# Patient Record
Sex: FEMALE | Race: WHITE | NOT HISPANIC OR LATINO | Employment: FULL TIME | URBAN - METROPOLITAN AREA
[De-identification: names, ages, dates, MRNs, and addresses within clinical notes are randomized per-mention and may not be internally consistent; named-entity substitution may affect disease eponyms.]

---

## 2022-08-18 DIAGNOSIS — Z52.001 STEM CELL DONOR: Primary | ICD-10-CM

## 2022-08-31 ENCOUNTER — LAB (OUTPATIENT)
Dept: LAB | Facility: CLINIC | Age: 27
End: 2022-08-31

## 2022-08-31 DIAGNOSIS — Z52.001 STEM CELL DONOR: ICD-10-CM

## 2022-08-31 PROCEDURE — 81382 HLA II TYPING 1 LOC HR: CPT

## 2022-09-14 LAB
A*: NORMAL
A*LOCUS SEROLOGIC EQUIVALENT: 2
A*LOCUS: NORMAL
A*SEROLOGIC EQUIVALENT: 30
ABTEST METHOD: NORMAL
B*: NORMAL
B*LOCUS SEROLOGIC EQUIVALENT: 13
B*LOCUS: NORMAL
B*SEROLOGIC EQUIVALENT: 62
BW-1: NORMAL
BW-2: NORMAL
C*LOCUS SEROLOGIC EQUIVALENT: 6
C*LOCUS: NORMAL
DPA1*: NORMAL
DPB1*: NORMAL
DPB1*LOCUS NMDP: NORMAL
DPB1*LOCUS: NORMAL
DPB1*NMDP: NORMAL
DQA1*: NORMAL
DQA1*LOCUS: NORMAL
DQB1*: NORMAL
DQB1*LOCUS SEROLOGIC EQUIVALENT: 7
DQB1*LOCUS: NORMAL
DQB1*SEROLOGIC EQUIVALENT: 8
DRB1*: NORMAL
DRB1*LOCUS SEROLOGIC EQUIVALENT: 4
DRB1*LOCUS: NORMAL
DRB1*SEROLOGIC EQUIVALENT: 8
DRB4*LOCUS SEROLOGIC EQUIVALENT: 53
DRB4*LOCUS: NORMAL
DRSSO TEST METHOD: NORMAL
ZZZDRNGS COMMENTS: NORMAL

## 2023-08-11 ENCOUNTER — HOSPITAL ENCOUNTER (EMERGENCY)
Facility: CLINIC | Age: 28
Discharge: HOME | End: 2023-08-11
Attending: EMERGENCY MEDICINE | Admitting: EMERGENCY MEDICINE
Payer: COMMERCIAL

## 2023-08-11 VITALS
WEIGHT: 110 LBS | RESPIRATION RATE: 16 BRPM | OXYGEN SATURATION: 98 % | TEMPERATURE: 98.5 F | DIASTOLIC BLOOD PRESSURE: 66 MMHG | HEIGHT: 63 IN | BODY MASS INDEX: 19.49 KG/M2 | SYSTOLIC BLOOD PRESSURE: 99 MMHG | HEART RATE: 66 BPM

## 2023-08-11 DIAGNOSIS — Z20.6 HIV EXPOSURE: ICD-10-CM

## 2023-08-11 PROCEDURE — 36415 COLL VENOUS BLD VENIPUNCTURE: CPT | Performed by: EMERGENCY MEDICINE

## 2023-08-11 PROCEDURE — 99284 EMERGENCY DEPT VISIT MOD MDM: CPT | Performed by: EMERGENCY MEDICINE

## 2023-08-11 PROCEDURE — 87536 HIV-1 QUANT&REVRSE TRNSCRPJ: CPT | Performed by: EMERGENCY MEDICINE

## 2023-08-11 PROCEDURE — 99283 EMERGENCY DEPT VISIT LOW MDM: CPT | Performed by: EMERGENCY MEDICINE

## 2023-08-11 PROCEDURE — 87389 HIV-1 AG W/HIV-1&-2 AB AG IA: CPT | Performed by: EMERGENCY MEDICINE

## 2023-08-11 RX ORDER — CITALOPRAM HYDROBROMIDE 40 MG/1
40 TABLET ORAL DAILY
COMMUNITY
End: 2024-05-01

## 2023-08-11 RX ORDER — CLONAZEPAM 0.5 MG/1
0.5 TABLET ORAL PRN
COMMUNITY

## 2023-08-12 LAB — HIV1 RNA # PLAS NAA DL=20: NOT DETECTED COPIES/ML

## 2023-08-12 NOTE — DISCHARGE INSTRUCTIONS
Please make an appointment to follow up with [facility] (phone: [phone]).  They should be reaching out to you but if you do not hear from them and your test is positive, please call them.  If not positive, you can still reach out to them or your primary care doctor to get on preventative HIV meds.     If sexually active, please use protection.

## 2023-08-12 NOTE — ED PROVIDER NOTES
"ED Provider Note  [facility]      History     Chief Complaint   Patient presents with    Exposure to STD     Pt states was exposed to HIV     HPI  Kaya Lundberg is a 27 year old female who presents to the ED for HIV exposure. Her current boyfriend just had a positive HIV test.  He is scheduled to see a provider regarding positive test result but has not started any treatment yet.  They have been sexually active without protection. She is here for evaluation. She denies any symptoms.  Her primary care doctor is located in [place] but she is here working in [place]. She doesn't have a primary care doctor in the [facility]. She denies unusual vaginal discharge or concerns. She is not pregnant. She denies weight loss.          Physical Exam   BP: 117/78  Pulse: 78  Temp: 98.3  F (36.8  C)  Resp: 18  Height: 160 cm (5' 3\")  Weight: 49.9 kg (110 lb)  SpO2: 95 %  Physical Exam  Vitals and nursing note reviewed.   Constitutional:       Appearance: She is normal weight.   HENT:      Head: Normocephalic and atraumatic.      Nose: No rhinorrhea.   Eyes:      Extraocular Movements: Extraocular movements intact.   Cardiovascular:      Rate and Rhythm: Normal rate and regular rhythm.   Pulmonary:      Effort: Pulmonary effort is normal.      Breath sounds: Normal breath sounds.   Musculoskeletal:         General: Normal range of motion.      Cervical back: Normal range of motion.   Lymphadenopathy:      Cervical: No cervical adenopathy.   Skin:     Coloration: Skin is not jaundiced or pale.   Neurological:      General: No focal deficit present.      Mental Status: She is alert and oriented to person, place, and time.   Psychiatric:         Mood and Affect: Mood is anxious. Affect is tearful.         Speech: Speech normal.         Behavior: Behavior normal. Behavior is cooperative.           ED Course, Procedures, & Data      Procedures         Mental Health Risk Assessment        PSS-3  "     Date and Time Over the past 2 weeks have you felt down, depressed, or hopeless? Over the past 2 weeks have you had thoughts of killing yourself? Have you ever attempted to kill yourself? When did this last happen? User   08/11/23 1936 yes no yes more than 6 months ago [facility]                     No results found for any visits on 08/11/23.  Medications - No data to display  Labs Ordered and Resulted from Time of ED Arrival to Time of ED Departure - No data to display  No orders to display          Critical care was not performed.     Medical Decision Making  The patient's presentation was of moderate complexity (an acute complicated injury).    The patient's evaluation involved:  ordering and/or review of 2 test(s) in this encounter (ordering of test but not review.  Test is pending at time of discharge)  discussion of management or test interpretation with another health professional (infection disease consult)    The patient's management necessitated only low risk treatment.    Assessment & Plan    Kaya Lundberg is a 27 year old female who presents to the ED for HIV exposure. Her current boyfriend just found out he has a positive hiv test.  He is scheduled to see a provider but has not started any medications yet.  They have been sexually active without protection. She denies any symptoms.  I spoke to the infectious disease provider on call.  They recommend getting hiv viral load and hiv antigen antibody test.  They took her MRN and will call her to schedule a follow up appointment.  They do not want to start any medications until they see her because the result determines what treatment is indicated.  Patient was discharged.     I have reviewed the nursing notes. I have reviewed the findings, diagnosis, plan and need for follow up with the patient.    New Prescriptions    No medications on file       Final diagnoses:   HIV exposure       Sydney Li MD  [facility] EMERGENCY DEPARTMENT  8/11/2023      Sydney Li MD  08/11/23 [id number]

## 2023-08-12 NOTE — ED TRIAGE NOTES
Pt states was exposed to HIV.       Triage Assessment       Row Name 08/11/23 1935       Triage Assessment (Adult)    Airway WDL WDL       Respiratory WDL    Respiratory WDL WDL       Skin Circulation/Temperature WDL    Skin Circulation/Temperature WDL WDL       Cardiac WDL    Cardiac WDL WDL       Peripheral/Neurovascular WDL    Peripheral Neurovascular WDL WDL       Cognitive/Neuro/Behavioral WDL    Cognitive/Neuro/Behavioral WDL WDL

## 2023-08-13 ENCOUNTER — HEALTH MAINTENANCE LETTER (OUTPATIENT)
Age: 28
End: 2023-08-13

## 2023-08-14 ENCOUNTER — TELEPHONE (OUTPATIENT)
Dept: INFECTIOUS DISEASES | Facility: CLINIC | Age: 28
End: 2023-08-14
Payer: COMMERCIAL

## 2023-08-14 LAB — HIV 1+2 AB+HIV1 P24 AG SERPL QL IA: NONREACTIVE

## 2023-08-14 NOTE — TELEPHONE ENCOUNTER
Called patient back, no answer. Left generic voicemail asking her to call back. Labwork done in ED on 8/11 reassuring so far. Will schedule patient with Sumi.

## 2023-08-14 NOTE — TELEPHONE ENCOUNTER
[facility] Call Center    Phone Message    May a detailed message be left on voicemail: yes     Reason for Call: Appointment Intake    Referring Provider Name: Dr Li  Diagnosis and/or Symptoms: HIV exposure. Sending encounter to clinic for review. Please call patient to schedule    Action Taken: Message routed to:  [facility] ([facility]): ID    Travel Screening: Not Applicable

## 2023-08-14 NOTE — TELEPHONE ENCOUNTER
"Returned call to patient. Patient reports she has been with partner for about 5 months and have been sexually active without condom use. Partner tested positive for HIV last week when he went to donate plasma. Patient reports partner is \"shocked\" and denies all risk factors for acquiring HIV. Discussed with patient that her initial results are reassuring and explained timeline for retesting. Patient voiced understanding and will meet with Sumi this Wednesday to discuss further.    Patient did endorse difficulty processing this, but denies any suicidal ideation. Provided patient with resources and will call back with any questions or concerns.  "

## 2023-08-14 NOTE — TELEPHONE ENCOUNTER
Patient confirmed appt made for her will work, she has a few questions. Please advise as seen fit thank you

## 2023-08-15 ASSESSMENT — ENCOUNTER SYMPTOMS
HALLUCINATIONS: 0
NIGHT SWEATS: 1
CHILLS: 0
DEPRESSION: 1
DECREASED CONCENTRATION: 1
INCREASED ENERGY: 0
ALTERED TEMPERATURE REGULATION: 0
POLYPHAGIA: 0
FATIGUE: 1
PANIC: 1
WEIGHT LOSS: 0
DECREASED APPETITE: 0
WEIGHT GAIN: 0
FEVER: 0
POLYDIPSIA: 0
NERVOUS/ANXIOUS: 1
HOT FLASHES: 0
INSOMNIA: 1
DECREASED LIBIDO: 0

## 2023-08-15 NOTE — TELEPHONE ENCOUNTER
DIAGNOSIS:  PREP   DATE RECEIVED: 8.16.23   NOTES (Gather within 2 years) STATUS DETAILS   OFFICE NOTE from referring provider       OFFICE NOTE from other specialist     DISCHARGE SUMMARY from hospital     DISCHARGE REPORT from the ER Internal 8.11.23  [facility]   LABS (any labs) Internal    MEDICATION LIST Internal    IMAGING  (NEED IMAGES AND REPORTS)     Osteomyelitis: Foot imaging      Liver Abscess: Abdominal imaging     Other (anything related to diagnoses

## 2023-08-15 NOTE — PROGRESS NOTES
"[facility]  HIV PrEP New Patient Visit  8/16/2023      HPI:  Kaay Lundberg is a 27 year old female who is here to discuss PrEP. Her partner was recently diagnosed with HIV during a blood screening test.     Patient reports she has been with partner for about 5 months and have been sexually active without condom use. Partner tested positive for HIV last week when he went to donate plasma. Patient reports partner is \"shocked\" and denies all risk factors for acquiring HIV.     Patient's boyfriend is present today and both report no other partners outside of the relationship. I was able to conduct part of this interview with only the patient present. Both patient and partner report no IV drug use. Both had sexual partners prior to this relationship with no condom use.     Vaginal discharge, white, when urinating, and associated vaginal itching. Denies dyspareunia, no dysuria. Feels like yeast infection    Has Nexplanon implant for pregnancy prevention    Denies any flulike symptoms concerning for active HIV infection: Fever, chills, night sweats, fatigue, body aches/muscle aches, skin rash, swollen or tender lymph nodes.      Currently taking post-exposure prophylaxis? no    Recent history of STI: chlamydia    Anal intercourse: yes  Receptive: yes    Always using condoms during intercourse: no    Condomless anal intercourse or have a condom break during intercourse in the past 6 months: yes    Any new partners in the past 6 months: previous relationship just prior to meeting her current. Male    Part of our work up for starting prep is screening for STIs, we screen for chlamydia, gonorrhoeae, and syphilis and we screen based on areas utilized for intercourse (urine/rectal/throat) would you prefer to haave all three areas screened: yes, triple site testing      ROS: Complete 12-point ROS is negative except as noted above.    Past Medical History:  No past medical history on file.    HIV History:  HIV " antigen and antibody combo: negative 8/11/23  Last HCB Screen: immunized    Past Surgical History:  Past Surgical History:   Procedure Laterality Date    APPENDECTOMY      ENT SURGERY         Social History:  Social History     Socioeconomic History    Marital status: Single     Spouse name: Not on file    Number of children: Not on file    Years of education: Not on file    Highest education level: Not on file   Occupational History    Not on file   Tobacco Use    Smoking status: Every Day     Types: Cigarettes, Vaping Device    Smokeless tobacco: Never   Substance and Sexual Activity    Alcohol use: Yes     Comment: occasionally    Drug use: Not Currently    Sexual activity: Yes   Other Topics Concern    Not on file   Social History Narrative    Not on file     Social Determinants of Health     Financial Resource Strain: Not on file   Food Insecurity: Not on file   Transportation Needs: Not on file   Physical Activity: Not on file   Stress: Not on file   Social Connections: Not on file   Intimate Partner Violence: Not on file   Housing Stability: Not on file       Family Medical History:  No family history on file.    Allergies:   No Known Allergies    Medications:  Current Outpatient Medications   Medication Sig Dispense Refill    citalopram (CELEXA) 40 MG tablet Take 40 mg by mouth daily      clonazePAM (KLONOPIN) 0.5 MG tablet Take 0.5 mg by mouth as needed for anxiety anxiety         Immunizations:  Immunization History   Administered Date(s) Administered    COVID-19 Vaccine (blinkbox) 04/10/2021       Exam:  B/P: Data Unavailable, T: Data Unavailable, P: Data Unavailable, R: Data Unavailable, Weight: 0 lbs 0 oz    Labs:  No results found for: CR  No results found for: AST  No results found for: ALT  T Cell Subset:  No results found for: ACD4, CD4, CD8, CD3T, CDTHTS, WBC, LYMPH, ACD3, ACD8    HIV-1 RNA Quantitative:  HIV-1 RNA copies/mL   Date Value Ref Range Status   08/11/2023 Not Detected Not Detected  Copies/mL Final       Hepatitis B Testing     No lab results found.    Hepatitis C Testing     No results found for: HCVAB, HQTG, HCGENO, HCPCR, HQTRNA, HEPRNA    Assessment and Plan:    Refrain from donating blood, plasma, organs, tissue, or semen. The usual duration for this precaution is 12 months     Therapeutic levels of Truvada take 7 days to develop in blood and rectal tissue and 20 days in vaginal tissue    Will recommend condom use for the next 20 days while starting Truvada and until your partner is consistently undetectable    Baseline labs today and yearly: creatinine and lipid panel    HIV testing today and in 3 weeks; then every 3 months while on PrEP    Repeat HIV testing today; will discuss about again about best practice to wait to start PrEP until one month HIV test is negative, but if still unable to prevent having sexual intercourse until this test, then will provide Truvada with the understanding that this is not ideal given the risk of developing resistant HIV if infected    Plan for sexually transmitted infection screening today and every 3 months on PrEP    Follow up with [facility] Pharmacist in the next 2 weeks; referral provided    Follow up with me in one month      Orders Placed This Encounter   Procedures    Hepatitis C Screen Reflex to HCV RNA Quant and Genotype    Hepatitis B Surface Antibody    Treponema Abs w Reflex to RPR and Titer    HCG qualitative urine    Creatinine    Lipid Profile    HIV Antigen Antibody Combo    HCG qualitative urine    Routine UA with micro - Reflex to culture          Discussed the following:   Usual time from HIV exposure to the development of symptoms is two to four weeks, although incubation periods as long as ten months     A negative HIV antigen/antibody test and negative virologic test strongly suggest that HIV infection has not been acquired. In the case of very recent high-risk exposures when HIV transmission remains a concern, repeat testing in one to  two weeks (especially if symptoms of acute HIV develop) is warranted.    Use condoms, or practice sexual abstinence to prevent sexual transmission and to avoid pregnancy during the follow-up period. This is particularly important during the first 12 weeks after exposure when most persons with HIV are expected to seroconvert.    Refrain from donating blood, plasma, organs, tissue, or semen. The usual duration for this precaution is 12 months       STI testing, HIV antigen antibody repeat testing were negative, and baseline labs normal.  Called patient to discuss results.  We also discussed that it is still possible that she could have HIV and that these tests are falsely negative if it is early in the infection.  The safest next step would be to retest HIV in 3 weeks and then if she is negative at that time provide oral PrEP for HIV prophylaxis.  The risk of providing oral prep at this time if she does truly have an HIV infection, we would be providing subtherapeutic treatment, which could select for resistant strains of HIV and leave her with fewer options for HIV treatment. She will need to remain abstinent during these 3 weeks and if unable to remain abstinent, should use protection with condoms; patient agreed that she should be able to follow through with this until her next testing in 3 weeks.      Oral PrEP:  Therapeutic levels in 7 days (blood, rectal) and 20 days (vaginal)  Truvada side effects: HA, stomach pain, weight loss, decrease HDL and LDL  Descovy side effects:  diarrhea, weight gain, increased TG    When initiating oral PrEP with plans for anal sex, we encourage patients to use condoms for at least 7 days to allow for drug concentrations that protect against HIV. Ideally we recommend continued use of condoms to prevent not only HIV transmission but also prevention of other STIs      Follow-up Oral Prep Timeline:    [facility]: 1 month after starting PrEP and then every 4 to 6 months   ID provider: 3 months  after staring PrEP and then every 6 to 12 months       Baseline monitoring:     Will place orders today: HIV Ab/Ag, CrCl, hepC screening, hepB screening, HIV RNA test within 7 days, STI screening, If oral prep we will also check kidney function and lipid panel      Follow up monitoring   Routine monitoring:   Oral PrEP:   HIV Ag/Ab every 3 months (add HIV RNA if concerned about acute infection)  STI screening every 3 to 6 months     Scr monitoring every 12 months (every 6 months if >50 years old, baseline CrCl <90 ml/min, or other risk factors for renal disease)  Repeat hepatitis C screening every 12 months if risk factors       If Stopping oral PrEP:    Complete an HIV Ag/Ab and HIV RNA*, STI screening, and Scr when stopping oral PrEP     If you were to stop PrEP at any point we would re-screen for STIs including HIV screening and kidney function. Protection from oral PrEP decreases after about 7-10 days after stopping     The risk of acquiring HIV increases around 8 weeks after last dose of Apretude as well as risk of selecting for a resistant strain of HIV. We recommend starting oral PrEP at stoppage or with a missed dose to bridge to next injection. If stopping Apretude all-together, know that this medication can last at a low level up to a year or longer in your body, which can increase the risk of resistant strains of HIV if not using oral PrEP      I spent 90 minutes as part of a visit on the date of the encounter doing chart review, history and exam, documentation, and care coordination.      BO Smyth, CNP  Infectious Diseases  Pager# [phone] and [employer]

## 2023-08-16 ENCOUNTER — PRE VISIT (OUTPATIENT)
Dept: INFECTIOUS DISEASES | Facility: CLINIC | Age: 28
End: 2023-08-16
Payer: COMMERCIAL

## 2023-08-16 ENCOUNTER — OFFICE VISIT (OUTPATIENT)
Dept: INFECTIOUS DISEASES | Facility: CLINIC | Age: 28
End: 2023-08-16
Attending: REGISTERED NURSE
Payer: COMMERCIAL

## 2023-08-16 ENCOUNTER — APPOINTMENT (OUTPATIENT)
Dept: LAB | Facility: CLINIC | Age: 28
End: 2023-08-16
Payer: COMMERCIAL

## 2023-08-16 VITALS
SYSTOLIC BLOOD PRESSURE: 108 MMHG | BODY MASS INDEX: 19.66 KG/M2 | DIASTOLIC BLOOD PRESSURE: 71 MMHG | OXYGEN SATURATION: 97 % | WEIGHT: 111 LBS | HEART RATE: 78 BPM

## 2023-08-16 DIAGNOSIS — N89.8 VAGINAL DISCHARGE: Primary | ICD-10-CM

## 2023-08-16 DIAGNOSIS — Z20.6 HIV EXPOSURE: ICD-10-CM

## 2023-08-16 DIAGNOSIS — Z11.4 ENCOUNTER FOR HIV SCREENING AND DISCUSSION OF PRE-EXPOSURE PROPHYLAXIS FOR HIV: ICD-10-CM

## 2023-08-16 DIAGNOSIS — Z29.81 ENCOUNTER FOR HIV SCREENING AND DISCUSSION OF PRE-EXPOSURE PROPHYLAXIS FOR HIV: ICD-10-CM

## 2023-08-16 DIAGNOSIS — Z71.89 ENCOUNTER FOR HIV SCREENING AND DISCUSSION OF PRE-EXPOSURE PROPHYLAXIS FOR HIV: ICD-10-CM

## 2023-08-16 LAB
ALBUMIN UR-MCNC: NEGATIVE MG/DL
APPEARANCE UR: CLEAR
BACTERIA #/AREA URNS HPF: ABNORMAL /HPF
BILIRUB UR QL STRIP: NEGATIVE
CHOLEST SERPL-MCNC: 183 MG/DL
COLOR UR AUTO: ABNORMAL
CREAT SERPL-MCNC: 0.8 MG/DL (ref 0.51–0.95)
GFR SERPL CREATININE-BSD FRML MDRD: >90 ML/MIN/1.73M2
GLUCOSE UR STRIP-MCNC: NEGATIVE MG/DL
HCG UR QL: NEGATIVE
HDLC SERPL-MCNC: 79 MG/DL
HGB UR QL STRIP: NEGATIVE
KETONES UR STRIP-MCNC: NEGATIVE MG/DL
LDLC SERPL CALC-MCNC: 78 MG/DL
LEUKOCYTE ESTERASE UR QL STRIP: NEGATIVE
MUCOUS THREADS #/AREA URNS LPF: PRESENT /LPF
NITRATE UR QL: NEGATIVE
NONHDLC SERPL-MCNC: 104 MG/DL
PH UR STRIP: 5.5 [PH] (ref 5–7)
RBC URINE: 1 /HPF
SP GR UR STRIP: 1.02 (ref 1–1.03)
SQUAMOUS EPITHELIAL: 3 /HPF
TRIGL SERPL-MCNC: 130 MG/DL
UROBILINOGEN UR STRIP-MCNC: NORMAL MG/DL
WBC URINE: 1 /HPF

## 2023-08-16 PROCEDURE — 81025 URINE PREGNANCY TEST: CPT | Performed by: REGISTERED NURSE

## 2023-08-16 PROCEDURE — 87389 HIV-1 AG W/HIV-1&-2 AB AG IA: CPT | Performed by: REGISTERED NURSE

## 2023-08-16 PROCEDURE — 99215 OFFICE O/P EST HI 40 MIN: CPT | Performed by: REGISTERED NURSE

## 2023-08-16 PROCEDURE — 86780 TREPONEMA PALLIDUM: CPT | Performed by: REGISTERED NURSE

## 2023-08-16 PROCEDURE — 36415 COLL VENOUS BLD VENIPUNCTURE: CPT | Performed by: REGISTERED NURSE

## 2023-08-16 PROCEDURE — 87491 CHLMYD TRACH DNA AMP PROBE: CPT | Performed by: REGISTERED NURSE

## 2023-08-16 PROCEDURE — G0463 HOSPITAL OUTPT CLINIC VISIT: HCPCS | Performed by: REGISTERED NURSE

## 2023-08-16 PROCEDURE — 82565 ASSAY OF CREATININE: CPT | Performed by: REGISTERED NURSE

## 2023-08-16 PROCEDURE — 86706 HEP B SURFACE ANTIBODY: CPT | Performed by: REGISTERED NURSE

## 2023-08-16 PROCEDURE — 87591 N.GONORRHOEAE DNA AMP PROB: CPT | Performed by: REGISTERED NURSE

## 2023-08-16 PROCEDURE — 80061 LIPID PANEL: CPT | Performed by: REGISTERED NURSE

## 2023-08-16 PROCEDURE — 86803 HEPATITIS C AB TEST: CPT | Performed by: REGISTERED NURSE

## 2023-08-16 PROCEDURE — 81003 URINALYSIS AUTO W/O SCOPE: CPT | Performed by: REGISTERED NURSE

## 2023-08-16 PROCEDURE — 99417 PROLNG OP E/M EACH 15 MIN: CPT | Performed by: REGISTERED NURSE

## 2023-08-16 RX ORDER — VALACYCLOVIR HYDROCHLORIDE 1 G/1
TABLET, FILM COATED ORAL
COMMUNITY
Start: 2023-07-24

## 2023-08-16 RX ORDER — GABAPENTIN 100 MG/1
CAPSULE ORAL
COMMUNITY
Start: 2021-11-18 | End: 2023-10-10

## 2023-08-16 ASSESSMENT — PAIN SCALES - GENERAL: PAINLEVEL: NO PAIN (0)

## 2023-08-16 NOTE — LETTER
"8/16/2023       RE: Kaya Lundberg  [address] [facility] [address]     Dear Colleague,    Thank you for referring your patient, Kaya Lundberg, to the [facility] [place] at [facility]. Please see a copy of my visit note below.    [facility]  HIV PrEP New Patient Visit  8/16/2023      HPI:  Kaya Lundberg is a 27 year old female who is here to discuss PrEP. Her partner was recently diagnosed with HIV during a blood screening test.     Patient reports she has been with partner for about 5 months and have been sexually active without condom use. Partner tested positive for HIV last week when he went to donate plasma. Patient reports partner is \"shocked\" and denies all risk factors for acquiring HIV.     Patient's boyfriend is present today and both report no other partners outside of the relationship. I was able to conduct part of this interview with only the patient present. Both patient and partner report no IV drug use. Both had sexual partners prior to this relationship with no condom use.     Vaginal discharge, white, when urinating, and associated vaginal itching. Denies dyspareunia, no dysuria. Feels like yeast infection    Has Nexplanon implant for pregnancy prevention    Denies any flulike symptoms concerning for active HIV infection: Fever, chills, night sweats, fatigue, body aches/muscle aches, skin rash, swollen or tender lymph nodes.      Currently taking post-exposure prophylaxis? no    Recent history of STI: chlamydia    Anal intercourse: yes  Receptive: yes    Always using condoms during intercourse: no    Condomless anal intercourse or have a condom break during intercourse in the past 6 months: yes    Any new partners in the past 6 months: previous relationship just prior to meeting her current. Male    Part of our work up for starting prep is screening for STIs, we screen for chlamydia, gonorrhoeae, and " syphilis and we screen based on areas utilized for intercourse (urine/rectal/throat) would you prefer to haave all three areas screened: yes, triple site testing      ROS: Complete 12-point ROS is negative except as noted above.    Past Medical History:  No past medical history on file.    HIV History:  HIV antigen and antibody combo: negative 8/11/23  Last HCB Screen: immunized    Past Surgical History:  Past Surgical History:   Procedure Laterality Date    APPENDECTOMY      ENT SURGERY         Social History:  Social History     Socioeconomic History    Marital status: Single     Spouse name: Not on file    Number of children: Not on file    Years of education: Not on file    Highest education level: Not on file   Occupational History    Not on file   Tobacco Use    Smoking status: Every Day     Types: Cigarettes, Vaping Device    Smokeless tobacco: Never   Substance and Sexual Activity    Alcohol use: Yes     Comment: occasionally    Drug use: Not Currently    Sexual activity: Yes   Other Topics Concern    Not on file   Social History Narrative    Not on file     Social Determinants of Health     Financial Resource Strain: Not on file   Food Insecurity: Not on file   Transportation Needs: Not on file   Physical Activity: Not on file   Stress: Not on file   Social Connections: Not on file   Intimate Partner Violence: Not on file   Housing Stability: Not on file       Family Medical History:  No family history on file.    Allergies:   No Known Allergies    Medications:  Current Outpatient Medications   Medication Sig Dispense Refill    citalopram (CELEXA) 40 MG tablet Take 40 mg by mouth daily      clonazePAM (KLONOPIN) 0.5 MG tablet Take 0.5 mg by mouth as needed for anxiety anxiety         Immunizations:  Immunization History   Administered Date(s) Administered    COVID-19 Vaccine ([employer]) 04/10/2021       Exam:  B/P: Data Unavailable, T: Data Unavailable, P: Data Unavailable, R: Data Unavailable, Weight: 0 lbs  0 oz    Labs:  No results found for: CR  No results found for: AST  No results found for: ALT  T Cell Subset:  No results found for: ACD4, CD4, CD8, CD3T, CDTHTS, WBC, LYMPH, ACD3, ACD8    HIV-1 RNA Quantitative:  HIV-1 RNA copies/mL   Date Value Ref Range Status   08/11/2023 Not Detected Not Detected Copies/mL Final       Hepatitis B Testing     No lab results found.    Hepatitis C Testing     No results found for: HCVAB, HQTG, HCGENO, HCPCR, HQTRNA, HEPRNA    Assessment and Plan:    Refrain from donating blood, plasma, organs, tissue, or semen. The usual duration for this precaution is 12 months     Therapeutic levels of Truvada take 7 days to develop in blood and rectal tissue and 20 days in vaginal tissue    Will recommend condom use for the next 20 days while starting Truvada and until your partner is consistently undetectable    Baseline labs today and yearly: creatinine and lipid panel    HIV testing today and in 3 weeks; then every 3 months while on PrEP    Repeat HIV testing today; will discuss about again about best practice to wait to start PrEP until one month HIV test is negative, but if still unable to prevent having sexual intercourse until this test, then will provide Truvada with the understanding that this is not ideal given the risk of developing resistant HIV if infected    Plan for sexually transmitted infection screening today and every 3 months on PrEP    Follow up with [facility] Pharmacist in the next 2 weeks; referral provided    Follow up with me in one month      Orders Placed This Encounter   Procedures    Hepatitis C Screen Reflex to HCV RNA Quant and Genotype    Hepatitis B Surface Antibody    Treponema Abs w Reflex to RPR and Titer    HCG qualitative urine    Creatinine    Lipid Profile    HIV Antigen Antibody Combo    HCG qualitative urine    Routine UA with micro - Reflex to culture          Discussed the following:   Usual time from HIV exposure to the development of symptoms is two to  four weeks, although incubation periods as long as ten months     A negative HIV antigen/antibody test and negative virologic test strongly suggest that HIV infection has not been acquired. In the case of very recent high-risk exposures when HIV transmission remains a concern, repeat testing in one to two weeks (especially if symptoms of acute HIV develop) is warranted.    Use condoms, or practice sexual abstinence to prevent sexual transmission and to avoid pregnancy during the follow-up period. This is particularly important during the first 12 weeks after exposure when most persons with HIV are expected to seroconvert.    Refrain from donating blood, plasma, organs, tissue, or semen. The usual duration for this precaution is 12 months       STI testing, HIV antigen antibody repeat testing were negative, and baseline labs normal.  Called patient to discuss results.  We also discussed that it is still possible that she could have HIV and that these tests are falsely negative if it is early in the infection.  The safest next step would be to retest HIV in 3 weeks and then if she is negative at that time provide oral PrEP for HIV prophylaxis.  The risk of providing oral prep at this time if she does truly have an HIV infection, we would be providing subtherapeutic treatment, which could select for resistant strains of HIV and leave her with fewer options for HIV treatment. She will need to remain abstinent during these 3 weeks and if unable to remain abstinent, should use protection with condoms; patient agreed that she should be able to follow through with this until her next testing in 3 weeks.      Oral PrEP:  Therapeutic levels in 7 days (blood, rectal) and 20 days (vaginal)  Truvada side effects: HA, stomach pain, weight loss, decrease HDL and LDL  Descovy side effects:  diarrhea, weight gain, increased TG    When initiating oral PrEP with plans for anal sex, we encourage patients to use condoms for at least 7  days to allow for drug concentrations that protect against HIV. Ideally we recommend continued use of condoms to prevent not only HIV transmission but also prevention of other STIs      Follow-up Oral Prep Timeline:    MTM: 1 month after starting PrEP and then every 4 to 6 months   ID provider: 3 months after staring PrEP and then every 6 to 12 months       Baseline monitoring:     Will place orders today: HIV Ab/Ag, CrCl, hepC screening, hepB screening, HIV RNA test within 7 days, STI screening, If oral prep we will also check kidney function and lipid panel      Follow up monitoring   Routine monitoring:   Oral PrEP:   HIV Ag/Ab every 3 months (add HIV RNA if concerned about acute infection)  STI screening every 3 to 6 months     Scr monitoring every 12 months (every 6 months if >50 years old, baseline CrCl <90 ml/min, or other risk factors for renal disease)  Repeat hepatitis C screening every 12 months if risk factors       If Stopping oral PrEP:    Complete an HIV Ag/Ab and HIV RNA*, STI screening, and Scr when stopping oral PrEP     If you were to stop PrEP at any point we would re-screen for STIs including HIV screening and kidney function. Protection from oral PrEP decreases after about 7-10 days after stopping     The risk of acquiring HIV increases around 8 weeks after last dose of Apretude as well as risk of selecting for a resistant strain of HIV. We recommend starting oral PrEP at stoppage or with a missed dose to bridge to next injection. If stopping Apretude all-together, know that this medication can last at a low level up to a year or longer in your body, which can increase the risk of resistant strains of HIV if not using oral PrEP      I spent 90 minutes as part of a visit on the date of the encounter doing chart review, history and exam, documentation, and care coordination.      BO Smyth, CNP  Infectious Diseases  Pager# [phone] and Vocera Brian

## 2023-08-16 NOTE — PATIENT INSTRUCTIONS
Usual time from HIV exposure to the development of symptoms is two to four weeks    Common symptoms for HIV include: Fever, chills, night sweats, fatigue, body aches/muscle aches, skin rash, swollen or tender lymph nodes.    Refrain from donating blood, plasma, organs, tissue, or semen. The usual duration for this precaution is 12 months     Therapeutic levels of Truvada take 7 days to develop in blood and rectal tissue and 20 days in vaginal tissue  Will recommend condom use for the next 20 days while starting Truvada and until your partner is consistently undetectable    Baseline labs today and yearly: creatinine and lipid panel    HIV testing today, in 2 weeks and one month; then every 3 months while on PrEP    If your HIV testing today is negative will send prescription for Truvada    Plan for sexually transmitted infection screening today and every 3 months on PrEP    Follow up with Pharmacist in the next 2 weeks    Follow up with me in one month    Truvada side effects include headache, stomach pain, weight loss, altered lipid panel. Some of these side effects such as headache and stomach pain will likely lessen over time.

## 2023-08-16 NOTE — NURSING NOTE
Chief Complaint   Patient presents with    Consult     /71 (BP Location: Right arm, Patient Position: Sitting, Cuff Size: Adult Regular)   Pulse 78   Wt 50.3 kg (111 lb)   SpO2 97%   BMI 19.66 kg/m

## 2023-08-17 LAB
C TRACH DNA SPEC QL PROBE+SIG AMP: NEGATIVE
HBV SURFACE AB SERPL IA-ACNC: 9.69 M[IU]/ML
HBV SURFACE AB SERPL IA-ACNC: NORMAL M[IU]/ML
HCV AB SERPL QL IA: NONREACTIVE
HIV 1+2 AB+HIV1 P24 AG SERPL QL IA: NONREACTIVE
N GONORRHOEA DNA SPEC QL NAA+PROBE: NEGATIVE
T PALLIDUM AB SER QL: NONREACTIVE

## 2023-08-18 ENCOUNTER — TELEPHONE (OUTPATIENT)
Dept: INFECTIOUS DISEASES | Facility: CLINIC | Age: 28
End: 2023-08-18
Payer: COMMERCIAL

## 2023-08-18 NOTE — TELEPHONE ENCOUNTER
MTM referral from: [facility] visit (referral by provider)    MTM referral outreach attempt #2 on August 18, 2023 at 1:53 PM      Outcome: Patient not reachable after several attempts, will route to MT Pharmacist/Provider as an FYI.  [facility] scheduling number is [phone].  Thank you for the referral.    Use dsm for the carrier/Plan on the flowsheet    Fozia Obrien - [facility]

## 2023-08-18 NOTE — TELEPHONE ENCOUNTER
EP LVM 8/18 to sched a 2 week (around 8/30) follow up with Brook Castellanos and a 1 month (around 9/16) follow up with Sumi Robertson per checkout notes from 8/16.

## 2023-08-21 ENCOUNTER — TELEPHONE (OUTPATIENT)
Dept: INFECTIOUS DISEASES | Facility: CLINIC | Age: 28
End: 2023-08-21
Payer: COMMERCIAL

## 2023-08-21 NOTE — TELEPHONE ENCOUNTER
Pt returned call from EP 8/21 to sched a 2 week follow up with Brook FALLON and a 1 month follow up with Sumi Robertson per checkout notes from 8/16. Appts are all set.

## 2023-09-16 NOTE — PROGRESS NOTES
"[facility]  HIV PrEP Patient Visit  9/19/2023      Interval History:  Boyfriend followed up with his labs. Pre patient she thinks that his CD4 count is quite low, was told by his doctor that he has likely had HIV for long time.  She is unable to tell me the specifics of his HIV RNA viral count or what his absolute CD4 count is, but is reaching out to him to see if she can get this information.  She has been dating him for about 7 or 8 months.    Has had intercourse with condoms about 3 times since seeing me last time.  When asked if the condom was intact, patient feels pretty sure it was intact but is unable to answer this surely.  She has recently had a sore throat but denies any symptoms such as fever, chills, night sweats, body aches, unintended weight loss, fatigue, new rash, or tender swollen lymph nodes concerning for acute HIV..      HPI:  Kaya Lundberg is a 27 year old female who is here to discuss PrEP. Her partner was recently diagnosed with HIV during a blood screening test.     Patient reports she has been with partner for about 5 months and have been sexually active without condom use. Partner tested positive for HIV last week when he went to donate plasma. Patient reports partner is \"shocked\" and denies all risk factors for acquiring HIV.     Patient's boyfriend is present today and both report no other partners outside of the relationship. I was able to conduct part of this interview with only the patient present. Both patient and partner report no IV drug use. Both had sexual partners prior to this relationship with no condom use.     Vaginal discharge, white, when urinating, and associated vaginal itching. Denies dyspareunia, no dysuria. Feels like yeast infection    Has Nexplanon implant for pregnancy prevention    Denies any flulike symptoms concerning for active HIV infection: Fever, chills, night sweats, fatigue, body aches/muscle aches, skin rash, swollen or tender lymph " nodes.      Currently taking post-exposure prophylaxis? no    Recent history of STI: chlamydia    Anal intercourse: yes  Receptive: yes    Always using condoms during intercourse: no    Condomless anal intercourse or have a condom break during intercourse in the past 6 months: yes    Any new partners in the past 6 months: previous relationship just prior to meeting her current. Male      ROS: Complete 12-point ROS is negative except as noted above.    Past Medical History:  No past medical history on file.    HIV History:  HIV antigen and antibody combo: negative 8/11/23  Last HCB Screen: immunized    Past Surgical History:  Past Surgical History:   Procedure Laterality Date    APPENDECTOMY      ENT SURGERY         Social History:  Social History     Socioeconomic History    Marital status: Single     Spouse name: Not on file    Number of children: Not on file    Years of education: Not on file    Highest education level: Not on file   Occupational History    Not on file   Tobacco Use    Smoking status: Former     Types: Cigarettes    Smokeless tobacco: Never   Vaping Use    Vaping Use: Every day    Substances: Nicotine    Devices: Pre-filled pod   Substance and Sexual Activity    Alcohol use: Yes     Comment: occasionally    Drug use: Not Currently    Sexual activity: Yes   Other Topics Concern    Not on file   Social History Narrative    Not on file     Social Determinants of Health     Financial Resource Strain: Not on file   Food Insecurity: Not on file   Transportation Needs: Not on file   Physical Activity: Not on file   Stress: Not on file   Social Connections: Not on file   Intimate Partner Violence: Not on file   Housing Stability: Not on file       Family Medical History:  No family history on file.    Allergies:   No Known Allergies    Medications:  Current Outpatient Medications   Medication Sig Dispense Refill    citalopram (CELEXA) 40 MG tablet Take 40 mg by mouth daily      clonazePAM (KLONOPIN) 0.5  "MG tablet Take 0.5 mg by mouth as needed for anxiety anxiety      gabapentin (NEURONTIN) 100 MG capsule TAKE 2 CAPSULES(200 MG) BY MOUTH DAILY AT BEDTIME      valACYclovir (VALTREX) 1000 mg tablet          Immunizations:  Immunization History   Administered Date(s) Administered    COVID-19 MONOVALENT 12+ (Pfizer) 11/16/2021    COVID-19 Vaccine ([employer]) 04/10/2021       Exam:  /79 (BP Location: Right arm, Patient Position: Sitting, Cuff Size: Adult Regular)   Pulse 75   Temp 99.3  F (37.4  C) (Oral)   Resp 18   Ht 1.6 m (5' 2.99\")   Wt 48.5 kg (107 lb)   SpO2 99%   BMI 18.96 kg/m        Labs:  Creatinine   Date Value Ref Range Status   08/16/2023 0.80 0.51 - 0.95 mg/dL Final     No results found for: AST  No results found for: ALT  T Cell Subset:  No results found for: ACD4, CD4, CD8, CD3T, CDTHTS, WBC, LYMPH, ACD3, ACD8    HIV-1 RNA Quantitative:  HIV-1 RNA copies/mL   Date Value Ref Range Status   08/11/2023 Not Detected Not Detected Copies/mL Final       Hepatitis B Testing     Recent Labs   Lab Test 08/16/23  1719   AUSAB 9.69       Hepatitis C Testing     Hepatitis C Antibody   Date Value Ref Range Status   08/16/2023 Nonreactive Nonreactive Final       Assessment and Plan:    Refrain from donating blood, plasma, organs, tissue, or semen. The usual duration for this precaution is 12 months     Therapeutic levels of Truvada take 7 days to develop in blood and rectal tissue and 20 days in vaginal tissue    Will recommend condom use for the next 20 days while starting Truvada and until your partner is consistently undetectable    Yearly: creatinine and lipid panel; done previous visit 8/16/23    HIV testing today and then every 3 months while on PrEP    If HIV testing is negative today we will provide prescription for Truvada for 3 months    Plan for sexually transmitted infection screening every 4-6 months on PrEP; last done 8/16/23    Follow up with [facility] Pharmacist in the next 2 weeks; referral " provided    Follow up with me in 3 months or sooner         Discussed the following:   Usual time from HIV exposure to the development of symptoms is two to four weeks, although incubation periods as long as ten months     A negative HIV antigen/antibody test and negative virologic test strongly suggest that HIV infection has not been acquired.     Use condoms, or practice sexual abstinence to prevent sexual transmission and to avoid pregnancy during the follow-up period. This is particularly important during the first 12 weeks after exposure when most persons with HIV are expected to seroconvert.    Refrain from donating blood, plasma, organs, tissue, or semen. The usual duration for this precaution is 12 months       Oral PrEP:  Therapeutic levels in 7 days (blood, rectal) and 20 days (vaginal)  Truvada side effects: HA, stomach pain, weight loss, decrease HDL and LDL  Descovy side effects:  diarrhea, weight gain, increased TG    When initiating oral PrEP with plans for anal sex, we encourage patients to use condoms for at least 7 days to allow for drug concentrations that protect against HIV. Ideally we recommend continued use of condoms to prevent not only HIV transmission but also prevention of other STIs      Follow-up Oral Prep Timeline:    MTM: 1 month after starting PrEP and then every 4 to 6 months   ID provider: 3 months after staring PrEP and then every 6 to 12 months       Baseline monitoring:     Will place orders today: HIV Ab/Ag, CrCl, hepC screening, hepB screening, HIV RNA test within 7 days, STI screening, If oral prep we will also check kidney function and lipid panel      Follow up monitoring   Routine monitoring:   Oral PrEP:   HIV Ag/Ab every 3 months (add HIV RNA if concerned about acute infection)  STI screening every 3 to 6 months     Scr monitoring every 12 months (every 6 months if >50 years old, baseline CrCl <90 ml/min, or other risk factors for renal disease)  Repeat hepatitis C  screening every 12 months if risk factors       If Stopping oral PrEP:    Complete an HIV Ag/Ab and HIV RNA*, STI screening, and Scr when stopping oral PrEP     If you were to stop PrEP at any point we would re-screen for STIs including HIV screening and kidney function. Protection from oral PrEP decreases after about 7-10 days after stopping     The risk of acquiring HIV increases around 8 weeks after last dose of Apretude as well as risk of selecting for a resistant strain of HIV. We recommend starting oral PrEP at stoppage or with a missed dose to bridge to next injection. If stopping Apretude all-together, know that this medication can last at a low level up to a year or longer in your body, which can increase the risk of resistant strains of HIV if not using oral PrEP      I spent 60 minutes as part of a visit on the date of the encounter doing chart review, history and exam, documentation, and care coordination.      BO Smyth, CNP  Infectious Diseases  Pager# [phone] and Vocera Brian

## 2023-09-19 ENCOUNTER — LAB (OUTPATIENT)
Dept: LAB | Facility: CLINIC | Age: 28
End: 2023-09-19
Payer: COMMERCIAL

## 2023-09-19 ENCOUNTER — OFFICE VISIT (OUTPATIENT)
Dept: INFECTIOUS DISEASES | Facility: CLINIC | Age: 28
End: 2023-09-19
Attending: REGISTERED NURSE
Payer: COMMERCIAL

## 2023-09-19 VITALS
HEART RATE: 75 BPM | WEIGHT: 107 LBS | TEMPERATURE: 99.3 F | SYSTOLIC BLOOD PRESSURE: 114 MMHG | DIASTOLIC BLOOD PRESSURE: 79 MMHG | HEIGHT: 63 IN | BODY MASS INDEX: 18.96 KG/M2 | OXYGEN SATURATION: 99 % | RESPIRATION RATE: 18 BRPM

## 2023-09-19 DIAGNOSIS — Z71.89 ENCOUNTER FOR HIV SCREENING AND DISCUSSION OF PRE-EXPOSURE PROPHYLAXIS FOR HIV: ICD-10-CM

## 2023-09-19 DIAGNOSIS — N89.8 VAGINAL DISCHARGE: ICD-10-CM

## 2023-09-19 DIAGNOSIS — Z20.6 HIV EXPOSURE: ICD-10-CM

## 2023-09-19 DIAGNOSIS — Z29.81 ENCOUNTER FOR HIV SCREENING AND DISCUSSION OF PRE-EXPOSURE PROPHYLAXIS FOR HIV: Primary | ICD-10-CM

## 2023-09-19 DIAGNOSIS — Z11.4 ENCOUNTER FOR HIV SCREENING AND DISCUSSION OF PRE-EXPOSURE PROPHYLAXIS FOR HIV: Primary | ICD-10-CM

## 2023-09-19 DIAGNOSIS — Z11.4 ENCOUNTER FOR HIV SCREENING AND DISCUSSION OF PRE-EXPOSURE PROPHYLAXIS FOR HIV: ICD-10-CM

## 2023-09-19 DIAGNOSIS — Z71.89 ENCOUNTER FOR HIV SCREENING AND DISCUSSION OF PRE-EXPOSURE PROPHYLAXIS FOR HIV: Primary | ICD-10-CM

## 2023-09-19 DIAGNOSIS — Z29.81 ENCOUNTER FOR HIV SCREENING AND DISCUSSION OF PRE-EXPOSURE PROPHYLAXIS FOR HIV: ICD-10-CM

## 2023-09-19 PROCEDURE — G0463 HOSPITAL OUTPT CLINIC VISIT: HCPCS | Performed by: REGISTERED NURSE

## 2023-09-19 PROCEDURE — 87389 HIV-1 AG W/HIV-1&-2 AB AG IA: CPT | Performed by: REGISTERED NURSE

## 2023-09-19 PROCEDURE — 99215 OFFICE O/P EST HI 40 MIN: CPT | Performed by: REGISTERED NURSE

## 2023-09-19 PROCEDURE — 99000 SPECIMEN HANDLING OFFICE-LAB: CPT | Performed by: PATHOLOGY

## 2023-09-19 PROCEDURE — 87536 HIV-1 QUANT&REVRSE TRNSCRPJ: CPT | Performed by: REGISTERED NURSE

## 2023-09-19 PROCEDURE — 36415 COLL VENOUS BLD VENIPUNCTURE: CPT | Performed by: PATHOLOGY

## 2023-09-19 PROCEDURE — 99417 PROLNG OP E/M EACH 15 MIN: CPT | Performed by: REGISTERED NURSE

## 2023-09-19 ASSESSMENT — PAIN SCALES - GENERAL: PAINLEVEL: NO PAIN (0)

## 2023-09-19 NOTE — LETTER
"9/19/2023       RE: Kaya Lundberg  [address] [facility] [address]     Dear Colleague,    Thank you for referring your patient, Kaya Lundberg, to the [facility] INFECTIOUS DISEASE CLINIC [place] at [facility]. Please see a copy of my visit note below.    [facility]  HIV PrEP Patient Visit  9/19/2023      Interval History:  Boyfriend followed up with his labs. Pre patient she thinks that his CD4 count is quite low, was told by his doctor that he has likely had HIV for long time.  She is unable to tell me the specifics of his HIV RNA viral count or what his absolute CD4 count is, but is reaching out to him to see if she can get this information.  She has been dating him for about 7 or 8 months.    Has had intercourse with condoms about 3 times since seeing me last time.  When asked if the condom was intact, patient feels pretty sure it was intact but is unable to answer this surely.  She has recently had a sore throat but denies any symptoms such as fever, chills, night sweats, body aches, unintended weight loss, fatigue, new rash, or tender swollen lymph nodes concerning for acute HIV..      HPI:  Kaya Lundberg is a 27 year old female who is here to discuss PrEP. Her partner was recently diagnosed with HIV during a blood screening test.     Patient reports she has been with partner for about 5 months and have been sexually active without condom use. Partner tested positive for HIV last week when he went to donate plasma. Patient reports partner is \"shocked\" and denies all risk factors for acquiring HIV.     Patient's boyfriend is present today and both report no other partners outside of the relationship. I was able to conduct part of this interview with only the patient present. Both patient and partner report no IV drug use. Both had sexual partners prior to this relationship with no condom use.     Vaginal discharge, white, when urinating, " and associated vaginal itching. Denies dyspareunia, no dysuria. Feels like yeast infection    Has Nexplanon implant for pregnancy prevention    Denies any flulike symptoms concerning for active HIV infection: Fever, chills, night sweats, fatigue, body aches/muscle aches, skin rash, swollen or tender lymph nodes.      Currently taking post-exposure prophylaxis? no    Recent history of STI: chlamydia    Anal intercourse: yes  Receptive: yes    Always using condoms during intercourse: no    Condomless anal intercourse or have a condom break during intercourse in the past 6 months: yes    Any new partners in the past 6 months: previous relationship just prior to meeting her current. Male      ROS: Complete 12-point ROS is negative except as noted above.    Past Medical History:  No past medical history on file.    HIV History:  HIV antigen and antibody combo: negative 8/11/23  Last HCB Screen: immunized    Past Surgical History:  Past Surgical History:   Procedure Laterality Date    APPENDECTOMY      ENT SURGERY         Social History:  Social History     Socioeconomic History    Marital status: Single     Spouse name: Not on file    Number of children: Not on file    Years of education: Not on file    Highest education level: Not on file   Occupational History    Not on file   Tobacco Use    Smoking status: Former     Types: Cigarettes    Smokeless tobacco: Never   Vaping Use    Vaping Use: Every day    Substances: Nicotine    Devices: Pre-filled pod   Substance and Sexual Activity    Alcohol use: Yes     Comment: occasionally    Drug use: Not Currently    Sexual activity: Yes   Other Topics Concern    Not on file   Social History Narrative    Not on file     Social Determinants of Health     Financial Resource Strain: Not on file   Food Insecurity: Not on file   Transportation Needs: Not on file   Physical Activity: Not on file   Stress: Not on file   Social Connections: Not on file   Intimate Partner Violence: Not on  "file   Housing Stability: Not on file       Family Medical History:  No family history on file.    Allergies:   No Known Allergies    Medications:  Current Outpatient Medications   Medication Sig Dispense Refill    citalopram (CELEXA) 40 MG tablet Take 40 mg by mouth daily      clonazePAM (KLONOPIN) 0.5 MG tablet Take 0.5 mg by mouth as needed for anxiety anxiety      gabapentin (NEURONTIN) 100 MG capsule TAKE 2 CAPSULES(200 MG) BY MOUTH DAILY AT BEDTIME      valACYclovir (VALTREX) 1000 mg tablet          Immunizations:  Immunization History   Administered Date(s) Administered    COVID-19 MONOVALENT 12+ (Pfizer) 11/16/2021    COVID-19 Vaccine ([employer]) 04/10/2021       Exam:  /79 (BP Location: Right arm, Patient Position: Sitting, Cuff Size: Adult Regular)   Pulse 75   Temp 99.3  F (37.4  C) (Oral)   Resp 18   Ht 1.6 m (5' 2.99\")   Wt 48.5 kg (107 lb)   SpO2 99%   BMI 18.96 kg/m        Labs:  Creatinine   Date Value Ref Range Status   08/16/2023 0.80 0.51 - 0.95 mg/dL Final     No results found for: AST  No results found for: ALT  T Cell Subset:  No results found for: ACD4, CD4, CD8, CD3T, CDTHTS, WBC, LYMPH, ACD3, ACD8    HIV-1 RNA Quantitative:  HIV-1 RNA copies/mL   Date Value Ref Range Status   08/11/2023 Not Detected Not Detected Copies/mL Final       Hepatitis B Testing     Recent Labs   Lab Test 08/16/23  1719   AUSAB 9.69       Hepatitis C Testing     Hepatitis C Antibody   Date Value Ref Range Status   08/16/2023 Nonreactive Nonreactive Final       Assessment and Plan:    Refrain from donating blood, plasma, organs, tissue, or semen. The usual duration for this precaution is 12 months     Therapeutic levels of Truvada take 7 days to develop in blood and rectal tissue and 20 days in vaginal tissue    Will recommend condom use for the next 20 days while starting Truvada and until your partner is consistently undetectable    Yearly: creatinine and lipid panel; done previous visit 8/16/23    HIV " testing today and then every 3 months while on PrEP    If HIV testing is negative today we will provide prescription for Truvada for 3 months    Plan for sexually transmitted infection screening every 4-6 months on PrEP; last done 8/16/23    Follow up with MTM Pharmacist in the next 2 weeks; referral provided    Follow up with me in 3 months or sooner         Discussed the following:   Usual time from HIV exposure to the development of symptoms is two to four weeks, although incubation periods as long as ten months     A negative HIV antigen/antibody test and negative virologic test strongly suggest that HIV infection has not been acquired.     Use condoms, or practice sexual abstinence to prevent sexual transmission and to avoid pregnancy during the follow-up period. This is particularly important during the first 12 weeks after exposure when most persons with HIV are expected to seroconvert.    Refrain from donating blood, plasma, organs, tissue, or semen. The usual duration for this precaution is 12 months       Oral PrEP:  Therapeutic levels in 7 days (blood, rectal) and 20 days (vaginal)  Truvada side effects: HA, stomach pain, weight loss, decrease HDL and LDL  Descovy side effects:  diarrhea, weight gain, increased TG    When initiating oral PrEP with plans for anal sex, we encourage patients to use condoms for at least 7 days to allow for drug concentrations that protect against HIV. Ideally we recommend continued use of condoms to prevent not only HIV transmission but also prevention of other STIs      Follow-up Oral Prep Timeline:    MTM: 1 month after starting PrEP and then every 4 to 6 months   ID provider: 3 months after staring PrEP and then every 6 to 12 months       Baseline monitoring:     Will place orders today: HIV Ab/Ag, CrCl, hepC screening, hepB screening, HIV RNA test within 7 days, STI screening, If oral prep we will also check kidney function and lipid panel      Follow up monitoring    Routine monitoring:   Oral PrEP:   HIV Ag/Ab every 3 months (add HIV RNA if concerned about acute infection)  STI screening every 3 to 6 months     Scr monitoring every 12 months (every 6 months if >50 years old, baseline CrCl <90 ml/min, or other risk factors for renal disease)  Repeat hepatitis C screening every 12 months if risk factors       If Stopping oral PrEP:    Complete an HIV Ag/Ab and HIV RNA*, STI screening, and Scr when stopping oral PrEP     If you were to stop PrEP at any point we would re-screen for STIs including HIV screening and kidney function. Protection from oral PrEP decreases after about 7-10 days after stopping     The risk of acquiring HIV increases around 8 weeks after last dose of Apretude as well as risk of selecting for a resistant strain of HIV. We recommend starting oral PrEP at stoppage or with a missed dose to bridge to next injection. If stopping Apretude all-together, know that this medication can last at a low level up to a year or longer in your body, which can increase the risk of resistant strains of HIV if not using oral PrEP      I spent 60 minutes as part of a visit on the date of the encounter doing chart review, history and exam, documentation, and care coordination.      BO Smyth, CNP  Infectious Diseases  Pager# [phone] and Clayton Brian

## 2023-09-19 NOTE — NURSING NOTE
"Chief Complaint   Patient presents with    RECHECK     PrEP     Vital signs:  Temp: 99.3  F (37.4  C) Temp src: Oral BP: 114/79 Pulse: 75   Resp: 18 SpO2: 99 %     Height: 160 cm (5' 2.99\") Weight: 48.5 kg (107 lb)  Estimated body mass index is 18.96 kg/m  as calculated from the following:    Height as of this encounter: 1.6 m (5' 2.99\").    Weight as of this encounter: 48.5 kg (107 lb).      Wanda Melara, [facility]  9/19/2023 3:51 PM    "

## 2023-09-19 NOTE — PATIENT INSTRUCTIONS
Screening today in lab  If negative will send out prescription for Truvada  Schedule with pharmacist Brook Castellanos  Schedule lab visit in 3 months  Follow up with me in 3 months

## 2023-09-20 LAB — HIV 1+2 AB+HIV1 P24 AG SERPL QL IA: NONREACTIVE

## 2023-09-21 DIAGNOSIS — Z29.81 ENCOUNTER FOR COUNSELING BEFORE STARTING AND ABOUT PRE-EXPOSURE PROPHYLAXIS FOR HIV: ICD-10-CM

## 2023-09-21 DIAGNOSIS — Z71.89 ENCOUNTER FOR COUNSELING BEFORE STARTING AND ABOUT PRE-EXPOSURE PROPHYLAXIS FOR HIV: ICD-10-CM

## 2023-09-21 DIAGNOSIS — Z11.4 ENCOUNTER FOR HIV SCREENING AND DISCUSSION OF PRE-EXPOSURE PROPHYLAXIS FOR HIV: Primary | ICD-10-CM

## 2023-09-21 DIAGNOSIS — Z71.89 ENCOUNTER FOR HIV SCREENING AND DISCUSSION OF PRE-EXPOSURE PROPHYLAXIS FOR HIV: Primary | ICD-10-CM

## 2023-09-21 DIAGNOSIS — Z29.81 ENCOUNTER FOR HIV SCREENING AND DISCUSSION OF PRE-EXPOSURE PROPHYLAXIS FOR HIV: Primary | ICD-10-CM

## 2023-09-21 LAB — HIV1 RNA # PLAS NAA DL=20: NOT DETECTED COPIES/ML

## 2023-09-21 RX ORDER — EMTRICITABINE AND TENOFOVIR DISOPROXIL FUMARATE 200; 300 MG/1; MG/1
1 TABLET, FILM COATED ORAL DAILY
Qty: 90 TABLET | Refills: 0 | Status: SHIPPED | OUTPATIENT
Start: 2023-09-21 | End: 2023-12-27

## 2023-10-10 ENCOUNTER — VIRTUAL VISIT (OUTPATIENT)
Dept: PHARMACY | Facility: CLINIC | Age: 28
End: 2023-10-10
Payer: COMMERCIAL

## 2023-10-10 DIAGNOSIS — F41.9 ANXIETY: ICD-10-CM

## 2023-10-10 DIAGNOSIS — Z20.6 EXPOSURE TO HIV: Primary | ICD-10-CM

## 2023-10-10 PROCEDURE — 99207 PR NO CHARGE LOS: CPT | Performed by: PHARMACIST

## 2023-10-10 NOTE — PROGRESS NOTES
Disease State Management Encounter:                          Kaya Lundberg is a 28 year old female called for an initial visit. She was referred to me from BO Smyth CNP.     Reason for visit: PrEP follow-up.    HIV PrEP: Currently taking Truvada (TDF/FTC) 1 tablet once daily for PrEP. Reports she hasn't started it yet. She is still with her partner who has HIV but hasn't been ready to be sexually active since learning about his diagnosis so has been holding off on starting PrEP.   Previous trials: none  Last HIV ab/ag test: nonreactive 9/19/23  Last HIV RNA test: nonreactive 9/19/23  Renal function: stable  Hepatitis B: immune  Hepatitis C: nonreactive 8/16/23  Pregnancy test: negative 8/16/23    STI screening/monitoring:   Treponemal Ab: nonreactive 8/16/23  Gonorrhea (cervix/rectal/throat): nonreactive 8/16/23  Chlamydia (cervix/rectal/throat): nonreactive 8/16/23    Today's Vitals: There were no vitals taken for this visit.    Anxiety:   She is out of citalopram and clonazepam and her anxiety has been worse. Has been out of citalopram for about 1 month. Her primary care provider had a leave of absence and switched health systems so she's had difficulty getting a refill as a result.     Assessment/Plan:  Patient will let us know when she's ready to start PrEP. Baseline HIV testing was negative. She will send us a message when she's ready to start Truvada so we can determine if we should re-screen for HIV. It takes about 20 days for Truvada to provide adequate protection against HIV in the vaginal tissue, so recommend starting at least 20 days before being sexually active with partner. If partner who is taking medications and has an undetectable viral load, you cannot acquire HIV through sex even if you don't use condoms.     Recommend contacting primary care provider clinic to ask for refill to bridge until next appt. Recommend re-starting citalopram at lowest dose and tapering up.     Monitoring if  she starts PrEP:  HIV Ag/Ab every 3 months (add HIV RNA if concerned about acute infection)  STI screening every 3 to 6 months     Scr monitoring every 12 months     Follow-up: 2 months with BO Smyth CNP. 5 months with medication therapy management if she starts Truvada    I spent 6 minutes with this patient today. All changes were made via collaborative practice agreement with BO Smyth CNP . A copy of the visit note was provided to the patient's provider(s).    A summary of these recommendations was sent via [employer].     Medication Therapy Recommendations  No medication therapy recommendations to display

## 2023-10-10 NOTE — PATIENT INSTRUCTIONS
Recommendations from today's disease management visit:                                                      Let us know if you decide to start Truvada. It takes about 20 days to build up adequate protection in vaginal tissues.     Here's more information on HIV and sex:   If your partner is taking HIV medication and has an undetectable viral load (HIV RNA <20 copies), they cannot spread HIV through sex even if you don't use condoms.     More info at these links:   https://clinicalinfo.hiv.gov/en/news/science-clear-hiv-xynylhqwqoon-vzmsfu-ozbbxhmttwjxsav   https://www.aidsmap.com/about-hiv/undetectable-viral-load-and-hiv-transmission      To schedule another MTM appointment, please call the clinic directly or you may call the MTM scheduling line at [phone] or toll-free at [phone].     My Clinical Pharmacist's contact information:                                                      Please feel free to contact me with any questions or concerns you have.      Brook Castellanos, PharmD, AAHIVP  Medication Therapy Management Pharmacist   October 10, 2023  [phone]

## 2023-11-29 ENCOUNTER — TELEPHONE (OUTPATIENT)
Dept: INFECTIOUS DISEASES | Facility: CLINIC | Age: 28
End: 2023-11-29
Payer: COMMERCIAL

## 2023-11-29 NOTE — TELEPHONE ENCOUNTER
EP called 11/29 to resched 12/19 follow up with Sumi; she's leaving [facility]

## 2023-12-23 DIAGNOSIS — Z29.81 ENCOUNTER FOR HIV SCREENING AND DISCUSSION OF PRE-EXPOSURE PROPHYLAXIS FOR HIV: ICD-10-CM

## 2023-12-23 DIAGNOSIS — Z71.89 ENCOUNTER FOR HIV SCREENING AND DISCUSSION OF PRE-EXPOSURE PROPHYLAXIS FOR HIV: ICD-10-CM

## 2023-12-23 DIAGNOSIS — Z11.4 ENCOUNTER FOR HIV SCREENING AND DISCUSSION OF PRE-EXPOSURE PROPHYLAXIS FOR HIV: ICD-10-CM

## 2023-12-23 DIAGNOSIS — Z71.89 ENCOUNTER FOR COUNSELING BEFORE STARTING AND ABOUT PRE-EXPOSURE PROPHYLAXIS FOR HIV: ICD-10-CM

## 2023-12-23 DIAGNOSIS — Z29.81 ENCOUNTER FOR COUNSELING BEFORE STARTING AND ABOUT PRE-EXPOSURE PROPHYLAXIS FOR HIV: ICD-10-CM

## 2023-12-27 PROBLEM — Z79.899 ON PRE-EXPOSURE PROPHYLAXIS FOR HIV: Status: ACTIVE | Noted: 2023-12-27

## 2023-12-27 RX ORDER — EMTRICITABINE AND TENOFOVIR DISOPROXIL FUMARATE 200; 300 MG/1; MG/1
1 TABLET, FILM COATED ORAL DAILY
Qty: 90 TABLET | Refills: 0 | Status: SHIPPED | OUTPATIENT
Start: 2023-12-27 | End: 2023-12-28

## 2023-12-28 ENCOUNTER — TELEPHONE (OUTPATIENT)
Dept: INFECTIOUS DISEASES | Facility: CLINIC | Age: 28
End: 2023-12-28
Payer: COMMERCIAL

## 2023-12-28 NOTE — TELEPHONE ENCOUNTER
EP LVM 12/28 to sched a lab appt (asap) and sched a follow up with any B20 [facility] provider; 30 minutes per Katerin. Former pt of Sumi Robertson.     ----- Message from Kamille Vazquez RN sent at 12/28/2023 11:11 AM CST -----  Hello!    Can we get patient scheduled for labs ASAP? And then 30m follow up next avail with any b20 provider!    Thanks!

## 2023-12-28 NOTE — TELEPHONE ENCOUNTER
[facility] Call Center    Phone Message    May a detailed message be left on voicemail: yes     Reason for Call: Medication Question or concern regarding medication   Prescription Clarification    Name of Medication:   emtricitabine-tenofovir (TRUVADA) 200-300 MG per tablet     Prescribing Provider: Marcelo     Pharmacy: [id number] - [place], [place] - [address]      What on the order needs clarification? [facility] is returning a call from the nurse. [facility] states patient had picked up the medication yesterday before they received the cancellation notice today. [facility] states she wanted the clinic to be aware patient does have the medication in hand. Please advise.       Action Taken: Message routed to:  [facility]    Travel Screening: Not Applicable

## 2024-01-02 ENCOUNTER — TELEPHONE (OUTPATIENT)
Dept: INFECTIOUS DISEASES | Facility: CLINIC | Age: 29
End: 2024-01-02
Payer: COMMERCIAL

## 2024-01-02 NOTE — TELEPHONE ENCOUNTER
Called pt (2nd attempt) for the patient to call back and schedule the following:    Appointment type: RB20  Provider: ANY B20 PROVIDER  Return date: NEXT AVAILABLE  Specialty phone number: [phone]  Additional appointment(s) needed: LAB (ASAP)  Additonal Notes: per RN.

## 2024-01-18 ENCOUNTER — TELEPHONE (OUTPATIENT)
Dept: INFECTIOUS DISEASES | Facility: CLINIC | Age: 29
End: 2024-01-18
Payer: COMMERCIAL

## 2024-01-18 DIAGNOSIS — Z11.4 ENCOUNTER FOR HIV SCREENING AND DISCUSSION OF PRE-EXPOSURE PROPHYLAXIS FOR HIV: Primary | ICD-10-CM

## 2024-01-18 DIAGNOSIS — Z29.81 ENCOUNTER FOR HIV SCREENING AND DISCUSSION OF PRE-EXPOSURE PROPHYLAXIS FOR HIV: Primary | ICD-10-CM

## 2024-01-18 DIAGNOSIS — Z71.89 ENCOUNTER FOR HIV SCREENING AND DISCUSSION OF PRE-EXPOSURE PROPHYLAXIS FOR HIV: Primary | ICD-10-CM

## 2024-04-16 ENCOUNTER — PATIENT OUTREACH (OUTPATIENT)
Dept: INFECTIOUS DISEASES | Facility: CLINIC | Age: 29
End: 2024-04-16
Payer: COMMERCIAL

## 2024-04-16 NOTE — TELEPHONE ENCOUNTER
Social Work - Intervention  [facility]  Data/Intervention: 2024    Patient Name: Kaya Lundberg Goes By: Kaya VELASQUEZ/Age: 1995 (28 year old)     Visit Type: Telephone  Referral Source: Katerin Vazquez RN   Reason for Referral: General Check In/Appointment needed    Collaborated With:    -Patient     Psychosocial Information/Concerns:  Patient is in need of an appointment with a provider, team has been unable to contact patient in past.      Intervention/Education/Resources Provided:  Writer called Patient and left a voicemail requesting a call back to assist with setting up an appointment and addressing any barriers to care.      Assessment/Plan:  Writer will await patient to call back or contact clinic. Will monitor.      Provided patient with contact information and availability.      MAXIM Crocker, [facility]  Infectious Disease

## 2024-04-30 ENCOUNTER — PATIENT OUTREACH (OUTPATIENT)
Dept: INFECTIOUS DISEASES | Facility: CLINIC | Age: 29
End: 2024-04-30
Payer: COMMERCIAL

## 2024-04-30 NOTE — TELEPHONE ENCOUNTER
Social Work - Follow-Up  [facility]    Data/Intervention: 2024    Patient Name: Kaya Lundberg Goes By: Kaya VELASQUEZ/Age: 1995 (28 year old)    Reason for Follow-Up:  Patient contacted Writer back from earlier voicemail.     Collaborated With:    -Patient  -RN Team     Intervention/Education/Resources Provided:  Patient called back and thanked Writer for calling to check in. Patient noted that her schedule has been busy and she intends to establish a new provider at [facility]. Writer worked with the RN Team to schedule Patient with an appointment in May. Patient denied any barriers to attend this appointment, noting that she is in finals week for school this week and will have more time once school is done. Patient reports she is doing well.     Assessment/Plan:  Patient stated they would attend the scheduled appointment and reach out  to Writer or the clinic if further assistance was needed before or after the appointment.    Previously provided patient with writer's contact information and availability.    MAXIM Crocker, [facility]  Infectious Disease

## 2024-05-01 ENCOUNTER — LAB (OUTPATIENT)
Dept: LAB | Facility: CLINIC | Age: 29
End: 2024-05-01
Payer: COMMERCIAL

## 2024-05-01 ENCOUNTER — OFFICE VISIT (OUTPATIENT)
Dept: INTERNAL MEDICINE | Facility: CLINIC | Age: 29
End: 2024-05-01
Payer: COMMERCIAL

## 2024-05-01 VITALS
HEART RATE: 97 BPM | OXYGEN SATURATION: 99 % | WEIGHT: 104.2 LBS | BODY MASS INDEX: 19.17 KG/M2 | SYSTOLIC BLOOD PRESSURE: 94 MMHG | HEIGHT: 62 IN | DIASTOLIC BLOOD PRESSURE: 63 MMHG | RESPIRATION RATE: 16 BRPM

## 2024-05-01 DIAGNOSIS — F41.9 ANXIETY DISORDER, UNSPECIFIED TYPE: ICD-10-CM

## 2024-05-01 DIAGNOSIS — F33.9 DEPRESSION, RECURRENT (H): ICD-10-CM

## 2024-05-01 DIAGNOSIS — R25.2 MUSCLE CRAMP: ICD-10-CM

## 2024-05-01 DIAGNOSIS — R20.2 PINS AND NEEDLES SENSATION: Primary | ICD-10-CM

## 2024-05-01 DIAGNOSIS — R20.2 PINS AND NEEDLES SENSATION: ICD-10-CM

## 2024-05-01 LAB
ALBUMIN SERPL BCG-MCNC: 4.9 G/DL (ref 3.5–5.2)
ALP SERPL-CCNC: 57 U/L (ref 40–150)
ALT SERPL W P-5'-P-CCNC: 19 U/L (ref 0–50)
ANION GAP SERPL CALCULATED.3IONS-SCNC: 13 MMOL/L (ref 7–15)
AST SERPL W P-5'-P-CCNC: 24 U/L (ref 0–45)
BASOPHILS # BLD AUTO: 0 10E3/UL (ref 0–0.2)
BASOPHILS NFR BLD AUTO: 0 %
BILIRUB SERPL-MCNC: 0.9 MG/DL
BUN SERPL-MCNC: 18.8 MG/DL (ref 6–20)
CALCIUM SERPL-MCNC: 10 MG/DL (ref 8.6–10)
CHLORIDE SERPL-SCNC: 101 MMOL/L (ref 98–107)
CREAT SERPL-MCNC: 0.77 MG/DL (ref 0.51–0.95)
CRP SERPL-MCNC: <3 MG/L
DEPRECATED HCO3 PLAS-SCNC: 24 MMOL/L (ref 22–29)
EGFRCR SERPLBLD CKD-EPI 2021: >90 ML/MIN/1.73M2
EOSINOPHIL # BLD AUTO: 0.1 10E3/UL (ref 0–0.7)
EOSINOPHIL NFR BLD AUTO: 1 %
ERYTHROCYTE [DISTWIDTH] IN BLOOD BY AUTOMATED COUNT: 11.8 % (ref 10–15)
ERYTHROCYTE [SEDIMENTATION RATE] IN BLOOD BY WESTERGREN METHOD: 13 MM/HR (ref 0–20)
FOLATE SERPL-MCNC: 26.2 NG/ML (ref 4.6–34.8)
GLUCOSE SERPL-MCNC: 84 MG/DL (ref 70–99)
HCT VFR BLD AUTO: 42.1 % (ref 35–47)
HGB BLD-MCNC: 14.3 G/DL (ref 11.7–15.7)
IMM GRANULOCYTES # BLD: 0 10E3/UL
IMM GRANULOCYTES NFR BLD: 0 %
LYMPHOCYTES # BLD AUTO: 1.6 10E3/UL (ref 0.8–5.3)
LYMPHOCYTES NFR BLD AUTO: 15 %
MAGNESIUM SERPL-MCNC: 2.2 MG/DL (ref 1.7–2.3)
MCH RBC QN AUTO: 32.9 PG (ref 26.5–33)
MCHC RBC AUTO-ENTMCNC: 34 G/DL (ref 31.5–36.5)
MCV RBC AUTO: 97 FL (ref 78–100)
MONOCYTES # BLD AUTO: 0.6 10E3/UL (ref 0–1.3)
MONOCYTES NFR BLD AUTO: 5 %
NEUTROPHILS # BLD AUTO: 8.4 10E3/UL (ref 1.6–8.3)
NEUTROPHILS NFR BLD AUTO: 79 %
NRBC # BLD AUTO: 0 10E3/UL
NRBC BLD AUTO-RTO: 0 /100
PLATELET # BLD AUTO: 257 10E3/UL (ref 150–450)
POTASSIUM SERPL-SCNC: 4.2 MMOL/L (ref 3.4–5.3)
PROT SERPL-MCNC: 7.7 G/DL (ref 6.4–8.3)
RBC # BLD AUTO: 4.34 10E6/UL (ref 3.8–5.2)
SODIUM SERPL-SCNC: 138 MMOL/L (ref 135–145)
TSH SERPL DL<=0.005 MIU/L-ACNC: 0.51 UIU/ML (ref 0.3–4.2)
WBC # BLD AUTO: 10.7 10E3/UL (ref 4–11)

## 2024-05-01 PROCEDURE — 85652 RBC SED RATE AUTOMATED: CPT | Performed by: PATHOLOGY

## 2024-05-01 PROCEDURE — 36415 COLL VENOUS BLD VENIPUNCTURE: CPT | Performed by: PATHOLOGY

## 2024-05-01 PROCEDURE — 82746 ASSAY OF FOLIC ACID SERUM: CPT

## 2024-05-01 PROCEDURE — 99204 OFFICE O/P NEW MOD 45 MIN: CPT

## 2024-05-01 PROCEDURE — 99000 SPECIMEN HANDLING OFFICE-LAB: CPT | Performed by: PATHOLOGY

## 2024-05-01 PROCEDURE — 83735 ASSAY OF MAGNESIUM: CPT | Performed by: PATHOLOGY

## 2024-05-01 PROCEDURE — 80053 COMPREHEN METABOLIC PANEL: CPT | Performed by: PATHOLOGY

## 2024-05-01 PROCEDURE — 86140 C-REACTIVE PROTEIN: CPT | Performed by: PATHOLOGY

## 2024-05-01 PROCEDURE — 85025 COMPLETE CBC W/AUTO DIFF WBC: CPT | Performed by: PATHOLOGY

## 2024-05-01 PROCEDURE — 82607 VITAMIN B-12: CPT

## 2024-05-01 PROCEDURE — 84443 ASSAY THYROID STIM HORMONE: CPT | Performed by: PATHOLOGY

## 2024-05-01 RX ORDER — AZELASTINE 1 MG/ML
1 SPRAY, METERED NASAL 2 TIMES DAILY
COMMUNITY
Start: 2023-12-23 | End: 2024-05-01

## 2024-05-01 RX ORDER — CITALOPRAM HYDROBROMIDE 40 MG/1
40 TABLET ORAL DAILY
Qty: 90 TABLET | Refills: 1 | Status: SHIPPED | OUTPATIENT
Start: 2024-05-01

## 2024-05-01 RX ORDER — ETONOGESTREL 68 MG/1
1 IMPLANT SUBCUTANEOUS ONCE
COMMUNITY

## 2024-05-01 ASSESSMENT — ANXIETY QUESTIONNAIRES
6. BECOMING EASILY ANNOYED OR IRRITABLE: SEVERAL DAYS
1. FEELING NERVOUS, ANXIOUS, OR ON EDGE: MORE THAN HALF THE DAYS
IF YOU CHECKED OFF ANY PROBLEMS ON THIS QUESTIONNAIRE, HOW DIFFICULT HAVE THESE PROBLEMS MADE IT FOR YOU TO DO YOUR WORK, TAKE CARE OF THINGS AT HOME, OR GET ALONG WITH OTHER PEOPLE: SOMEWHAT DIFFICULT
GAD7 TOTAL SCORE: 11
GAD7 TOTAL SCORE: 11
5. BEING SO RESTLESS THAT IT IS HARD TO SIT STILL: SEVERAL DAYS
7. FEELING AFRAID AS IF SOMETHING AWFUL MIGHT HAPPEN: SEVERAL DAYS
2. NOT BEING ABLE TO STOP OR CONTROL WORRYING: NEARLY EVERY DAY
3. WORRYING TOO MUCH ABOUT DIFFERENT THINGS: MORE THAN HALF THE DAYS

## 2024-05-01 ASSESSMENT — PATIENT HEALTH QUESTIONNAIRE - PHQ9
SUM OF ALL RESPONSES TO PHQ QUESTIONS 1-9: 5
5. POOR APPETITE OR OVEREATING: SEVERAL DAYS

## 2024-05-01 NOTE — COMMUNITY RESOURCES LIST (ENGLISH)
May 1, 2024           YOUR PERSONALIZED LIST OF SERVICES & PROGRAMS               Medical Transportation, (NEMT)      [place] - Transportation to medical appointments  [address] [place] [address] (Distance: 1.9 miles)  Website: https://www.[employer]/  Language: English  Fee: Self pay  Accessibility: Ada accessible, Blind accommodation, Deaf or hard of hearing      [facility] - Transportation to medical appointments - [facility]  [address] [place] [address] (Distance: 22.1 miles)  Website: http://www.Sonar.me  Language: English  Fee: Free  Accessibility: Blind accommodation      Social Service [facility] - Neighbor to Neighbor Program  Phone: (852) 943-4536  Email: [email or web address]  Website: https://www.[facility].org/services/older-adults/-services/neighbor-to-neighbor  Language: English  Hours: Mon 8:00 AM - 5:00 PM Tue 8:00 AM - 5:00 PM Wed 8:00 AM - 5:00 PM Thu 8:00 AM - 5:00 PM Fri 8:00 AM - 5:00 PM  Fee: Insurance, Self pay  Accessibility: Deaf or hard of hearing, Blind accommodation, Translation services    Expense Assistance      - Dislocated Worker/Adult [employer] Employment Program  Phone: (447) 586-4400  Email: miguelangel@[employer]  Website: https://[employer]/services/employment-services/dislocated-worker-program/  Language: English, North Korean  Hours: Mon 8:00 AM - 4:30 PM Tue 8:00 AM - 4:30 PM Wed 8:00 AM - 4:30 PM Thu 8:00 AM - 4:30 PM Fri 8:00 AM - 4:30 PM  Fee: Free  Accessibility: Ada accessible    Coordination      Bus - Ride coordination - [employer]  [address] [place] [address] (Distance: 5.1 miles)  Website: https://ridemetrobus.com/  Language: English  Fee: Self pay  Accessibility: Ada accessible, Deaf or hard of hearing      [place] - Ride coordination  [address] [place] [address] (Distance: 1.9 miles)  Website: https://www.countrymanorcampus.org/  Language: English  Fee: Self pay   Accessibility: Ada accessible, Blind accommodation, Deaf or hard of hearing      - Services  Phone: (285) 678-8272  Website: https://STEMpowerkids/#aide-section  Hours: Sun 12:00 AM - 11:45 PM Mon 12:00 AM - 11:45 PM Tue 12:00 AM - 11:45 PM Wed 12:00 AM - 11:45 PM Thu 12:00 AM - 11:45 PM Fri 12:00 AM - 11:45 PM Sat 12:00 AM - 11:45 PM  Fee: Self pay  Accessibility: Ada accessible, Blind accommodation, Deaf or hard of hearing            Bill Payment Assistance      [facility] [place], [place]  [address] [place] [address] (Distance: 6.2 miles)  Phone: (842) 686-3086  Language: English  Fee: Free      Action Program ([employer]) - Energy Assistance and Crisis Funding  [address] [place] [address] (Distance: 7.5 miles)  Phone: (540) 615-1263  Website: http://www.[employer]  Language: English  Fee: Free  Accessibility: Ada accessible, Translation services      - Dislocated Worker/Adult [employer] Employment Program  Phone: (607) 149-8261  Email: miguelangel@[employer]  Website: https://[employer]/services/employment-services/dislocated-worker-program/  Language: English, Trinidadian  Hours: Mon 8:00 AM - 4:30 PM Tue 8:00 AM - 4:30 PM Wed 8:00 AM - 4:30 PM Thu 8:00 AM - 4:30 PM Fri 8:00 AM - 4:30 PM  Fee: Free  Accessibility: Ada accessible               IMPORTANT NUMBERS & WEBSITES        Emergency Services  911  .   United Way  211 http://211unitedway.org  .   Poison Control  (736) 369-3479 http://[place]poison.org http://wisconsinpoison.org  .     Suicide and Crisis Lifeline  988 http://988lifeline.org  .   Childhelp National Child Abuse Hotline  202.705.1557 http://Childhelphotline.org   .   National Sexual Assault Hotline  (360) 763-9118 (HOPE) http://Rainn.org   .     National Runaway Safeline  (989) 461-9564 (RUNAWAY) http://1800runaway.org  .   Pregnancy & Postpartum Support  Call/text 592-343-0234  [place]: http://ppsupportmn.org  [place]: http://W4/wi  .    Substance Abuse National Helpline ([facility])  800-622-HELP (9428) http://Findtreatment.gov   .                DISCLAIMER: These resources have been generated via the [employer] Platform. [employer] does not endorse any service providers mentioned in this resource list. [employer] does not guarantee that the services mentioned in this resource list will be available to you or will improve your health or wellness.    [facility]

## 2024-05-01 NOTE — PROGRESS NOTES
Assessment & Plan     Pins and needles sensation  Pins and needles sensation in BUE and BLE. She did sustain a fall about one week ago, fell onto left wrist/arm without injury to neck. No point tenderness on exam today, ROM intact. She has approximately 14 alcoholic beverages per week. CBC, TSH, B12, folate, CMP, CRP, ESR, and Mg ordered today. Labs completed today and overall WNL. Will complete cervical spine XR. If WNL, plan to obtain brain and cervical spine MR due to rapid onset of symptoms in all extremities.  - CBC with platelets and differential  - Vitamin B12  - TSH with free T4 reflex  - Comprehensive metabolic panel (BMP + Alb, Alk Phos, ALT, AST, Total. Bili, TP)  - Folate  - Magnesium  - CRP, inflammation  - ESR: Erythrocyte sedimentation rate  - XR Cervical Spine 2/3 Views    Anxiety disorder, unspecified type  Depression, recurrent (H24)  She has been on citalopram 40 mg daily, requests a refill today. We discussed a mental health referral. She will consider, is agreeable to the referral being placed today.   - citalopram (CELEXA) 40 MG tablet  Dispense: 90 tablet; Refill: 1  - Adult Mental Health [facility] Referral    Muscle cramp  Intermittent muscle cramp, Mg ordered today and WNL at 2.2.  - Magnesium      Return in about 4 weeks (around 5/29/2024).    Alesia Kirkpatrick is a 28 year old, presenting for the following health issues:  Tingling (Comes and goes, worse in feet and night, shooting pain, started one week ago, constant tingling feeling, OTC medication, including Mg and vitamin B, doesn't help.)        5/1/2024     3:59 PM   Additional Questions   Roomed by ms emt     History of Present Illness       Reason for visit:  Hand arms legs and feet pain tingling pins and needles  Symptom onset:  3-7 days ago    She eats 0-1 servings of fruits and vegetables daily.She consumes 1 sweetened beverage(s) daily.She exercises with enough effort to increase her heart rate 20 to 29 minutes per day.  She  exercises with enough effort to increase her heart rate 4 days per week.   She is taking medications regularly.       Kaya presents to the clinic today to establish care and for concerns of numbness/tingling in hands and feet. She has a history of anxiety, ADHD, recurrent cold sores, and alcohol abuse.     She is concerned about paresethias. Started about a week ago. Pins and needles sensations in bilateral arms, hands, feet. Pain will worsen when she presses certain points, particularly on her right foot, feels electrical. Will feel leg and arm cramps on occasion. Reports symptoms as mild to severe, constant. She also feels weak, described as heavy sensation. She has not experienced this before.     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 Fell onto an outstretched wrist and onto her left arm/side. She did not hit her head, no neck injury.     No known recent illness. Loose stool once or twice per day, no nausea. She has about two drinks of alcohol per day. She will drink about 1 bottle of liquid IV daily in addition to 1L of water. Rare caffeine intake.       The CAGE screening questions (asking whether patients felt they should cut down on drinking, were annoyed by others criticizing her drinking, felt guilty about use, or ever had an eye opener) were asked of the patient to determine possible ETOH or chemical abuse issues.   Her positive answers are as follows.    Have you ever:  G     Patient felt bad or GUILTY about their drinking (or drug use).     PHQ-2 Score:         5/1/2024     3:48 PM 8/16/2023     3:37 PM   PHQ-2 ( 1999 [employer])   Q1: Little interest or pleasure in doing things 1 0   Q2: Feeling down, depressed or hopeless 1 1   PHQ-2 Score 2 1   Q1: Little interest or pleasure in doing things Several days    Q2: Feeling down, depressed or hopeless Several days    PHQ-2 Score 2             5/1/2024     6:07 PM   PHQ   PHQ-9 Total Score 5   Q9: Thoughts of better off  "dead/self-harm past 2 weeks Not at all          5/1/2024     6:07 PM   TETO-7 SCORE   Total Score 11        Review of Systems  Constitutional, HEENT, cardiovascular, pulmonary, GI, , musculoskeletal, neuro, skin, endocrine and psych systems are negative, except as otherwise noted.      Objective    BP 94/63 (BP Location: Right arm, Patient Position: Sitting, Cuff Size: Adult Regular)   Pulse 97   Resp 16   Ht 1.575 m (5' 2\")   Wt 47.3 kg (104 lb 3.2 oz)   SpO2 99%   Breastfeeding No   BMI 19.06 kg/m    Body mass index is 19.06 kg/m .  Physical Exam   GENERAL: alert and no distress  EYES: Eyes grossly normal to inspection, PERRL and conjunctivae and sclerae normal  NECK: no adenopathy, no asymmetry, masses, or scars  RESP: lungs clear to auscultation - no rales, rhonchi or wheezes  CV: regular rate and rhythm, normal S1 S2, no S3 or S4, no murmur, click or rub, no peripheral edema  MS: no gross musculoskeletal defects noted, no edema; ROM intact, no point tenderness with palpation of bilateral hands, left forearm, neck; BUE sensation grossly intact, she does report left dorsal hand sensation feels different than R, monofilament exam completed with normal sensation of bilateral feet.   NEURO: CN II-XII intact, strength 5/5 bilaterally,  mentation intact and speech normal  PSYCH: mentation appears normal and appearance well groomed    Results for orders placed or performed in visit on 05/01/24   Vitamin B12     Status: Normal   Result Value Ref Range    Vitamin B12 461 232 - 1,245 pg/mL   TSH with free T4 reflex     Status: Normal   Result Value Ref Range    TSH 0.51 0.30 - 4.20 uIU/mL   Comprehensive metabolic panel (BMP + Alb, Alk Phos, ALT, AST, Total. Bili, TP)     Status: Normal   Result Value Ref Range    Sodium 138 135 - 145 mmol/L    Potassium 4.2 3.4 - 5.3 mmol/L    Carbon Dioxide (CO2) 24 22 - 29 mmol/L    Anion Gap 13 7 - 15 mmol/L    Urea Nitrogen 18.8 6.0 - 20.0 mg/dL    Creatinine 0.77 0.51 - 0.95 " mg/dL    GFR Estimate >90 >60 mL/min/1.73m2    Calcium 10.0 8.6 - 10.0 mg/dL    Chloride 101 98 - 107 mmol/L    Glucose 84 70 - 99 mg/dL    Alkaline Phosphatase 57 40 - 150 U/L    AST 24 0 - 45 U/L    ALT 19 0 - 50 U/L    Protein Total 7.7 6.4 - 8.3 g/dL    Albumin 4.9 3.5 - 5.2 g/dL    Bilirubin Total 0.9 <=1.2 mg/dL   Folate     Status: Normal   Result Value Ref Range    Folic Acid 26.2 4.6 - 34.8 ng/mL   Magnesium     Status: Normal   Result Value Ref Range    Magnesium 2.2 1.7 - 2.3 mg/dL   CRP, inflammation     Status: Normal   Result Value Ref Range    CRP Inflammation <3.00 <5.00 mg/L   ESR: Erythrocyte sedimentation rate     Status: Normal   Result Value Ref Range    Erythrocyte Sedimentation Rate 13 0 - 20 mm/hr   CBC with platelets and differential     Status: Abnormal   Result Value Ref Range    WBC Count 10.7 4.0 - 11.0 10e3/uL    RBC Count 4.34 3.80 - 5.20 10e6/uL    Hemoglobin 14.3 11.7 - 15.7 g/dL    Hematocrit 42.1 35.0 - 47.0 %    MCV 97 78 - 100 fL    MCH 32.9 26.5 - 33.0 pg    MCHC 34.0 31.5 - 36.5 g/dL    RDW 11.8 10.0 - 15.0 %    Platelet Count 257 150 - 450 10e3/uL    % Neutrophils 79 %    % Lymphocytes 15 %    % Monocytes 5 %    % Eosinophils 1 %    % Basophils 0 %    % Immature Granulocytes 0 %    NRBCs per 100 WBC 0 <1 /100    Absolute Neutrophils 8.4 (H) 1.6 - 8.3 10e3/uL    Absolute Lymphocytes 1.6 0.8 - 5.3 10e3/uL    Absolute Monocytes 0.6 0.0 - 1.3 10e3/uL    Absolute Eosinophils 0.1 0.0 - 0.7 10e3/uL    Absolute Basophils 0.0 0.0 - 0.2 10e3/uL    Absolute Immature Granulocytes 0.0 <=0.4 10e3/uL    Absolute NRBCs 0.0 10e3/uL   CBC with platelets and differential     Status: Abnormal    Narrative    The following orders were created for panel order CBC with platelets and differential.  Procedure                               Abnormality         Status                     ---------                               -----------         ------                     CBC with platelets and  d...[[id number]]  Abnormal            Final result                 Please view results for these tests on the individual orders.           Signed Electronically by: Kaya Pritchett NP

## 2024-05-02 LAB — VIT B12 SERPL-MCNC: 461 PG/ML (ref 232–1245)

## 2024-05-06 ENCOUNTER — TELEPHONE (OUTPATIENT)
Dept: INTERNAL MEDICINE | Facility: CLINIC | Age: 29
End: 2024-05-06
Payer: COMMERCIAL

## 2024-05-06 NOTE — TELEPHONE ENCOUNTER
----- Message from aKya Pritchett NP sent at 5/3/2024  5:14 PM CDT -----  Hello,    Could someone assist this patient in scheduling an xray? She should have that completed first, can have MRIs scheduled after the xray is complete.    Thanks!  Kaya

## 2024-05-07 ENCOUNTER — ANCILLARY PROCEDURE (OUTPATIENT)
Dept: GENERAL RADIOLOGY | Facility: CLINIC | Age: 29
End: 2024-05-07
Payer: COMMERCIAL

## 2024-05-07 DIAGNOSIS — R20.2 PINS AND NEEDLES SENSATION: ICD-10-CM

## 2024-05-07 PROCEDURE — 72040 X-RAY EXAM NECK SPINE 2-3 VW: CPT | Mod: GC | Performed by: RADIOLOGY

## 2024-05-08 NOTE — PROGRESS NOTES
[facility]  General Infectious Disease Clinic Note: Follow Up     Patient:  Kaya Lundberg, Date of birth 1995   Medical record number [id number]  Date of Visit:  05/10/2024      Assessment       Serodiscordant partner  Encounter for PrEP counseling  Anxiety    Ms. Lundberg presents to ID clinic for follow up of PrEP, though she has not yet started it. Her serodiscordant partner currently has an undetectable viral load and is adherent to ART. As such, we discussed that her risk of acquiring HIV from him is very low (U=U), though not impossible. We discussed various ways viral loads can be high and the risk of transmission is possible. She understands this risk and would like to avoid taking PrEP. She notes she may consider using it in the future if she tries to conceive. We also discussed risk of HIV transmission from a mother to baby, and how it is very low if the mother has a suppressed viral load. She has not had an HIV test since September when her partner was diagnosed, and has had unprotected sex during that time. As such, I would like to obtain an HIV VL and Ag/Ab screen today. If negative, and her partner remains undetectable, her risk of HIV acquisition is very low. She doesn't need regular follow up with me if she is not taking PrEP, but I am very happy to see her in the future should she wish to start it.      Recommendations     HIV RNA and Ag/Ab screen today  Pt to contact us if/when she would like to start PrEP.     RTC: PRN    I spent a total of 30 minutes on the day of the visit.   Time spent by me doing chart review, history and exam, documentation and further activities per the note    Marjan Mg MD  Pager [phone]  Infectious Diseases     Interval Updates:   - Pt has not started PrEP. She remains sexually active with HIV+ partner. They occasionally use condoms, but not regularly.   - Denies flu-like illness, pharyngitis, cervical LAD, etc.   - Pt provides screen  "shots of partner's labs. VL is undetectable on dovato, CD4 is wnl. She thinks partner has had undetectable viral load for several months. He is regular with his follow up and takes his medication regularly.   - She states she is not currently interested in taking PrEP, given her partner's suppressed VL. She might consider PrEP in the future if she tries to conceive.      History of the Infectious Disease [facility]:   Kaya Lundberg is a 27 year old female who is here to discuss PrEP. Her partner was recently diagnosed with HIV during a blood screening test.      Patient reports she has been with partner for about 5 months and have been sexually active without condom use. Partner tested positive for HIV last week when he went to donate plasma. Patient reports partner is \"shocked\" and denies all risk factors for acquiring HIV.      Patient's boyfriend is present today and both report no other partners outside of the relationship. I was able to conduct part of this interview with only the patient present. Both patient and partner report no IV drug use. Both had sexual partners prior to this relationship with no condom use.     Review of Systems: The remainder of a complete ROS was negative, except as noted in HPI.     No past medical history on file.    Past Surgical History:   Procedure Laterality Date    APPENDECTOMY      ENT SURGERY      tonsillectomy and adenoidectomy    WISDOM TOOTH EXTRACTION         Immunization History   Administered Date(s) Administered    COVID-19 MONOVALENT 12+ (Pfizer) 11/16/2021    COVID-19 Vaccine ([email or web address]) 04/10/2021    DTAP (<7y) 05/09/2002    DTP-Hib 1995, 01/06/1997, 03/06/1997, 02/19/1998    HPV Quadrivalent 03/24/2008, 05/30/2008, 09/30/2008    Hepatitis B, Peds 1995, 01/06/1997, 03/06/1997    Influenza (IIV3) PF 11/06/2007, 10/16/2008, 09/22/2009    Influenza Intranasal Vaccine 11/08/2006    Influenza Vaccine >6 months,quad, PF 11/01/2017, 10/17/2018, 02/10/2020, " "11/16/2021    Influenza, seasonal, injectable, PF 11/06/2007, 10/16/2008    MMR 02/19/1998, 06/20/2007    Meningococcal ACWY (Menactra ) 06/15/2009, 08/29/2014    Meningococcal ACWY (Menveo ) 08/29/2014    OPV, trivalent, live 1996, 01/06/1997, 02/19/1998    OPV, unspecified 1996, 01/06/1997, 02/19/1998    Poliovirus, inactivated (IPV) 05/09/2002    TDAP (Adacel,Boostrix) 06/15/2009    Td, Absorbed, Pf, Adult, Lf Unspecified 02/10/2020    Tdap (Adult) Unspecified Formulation 02/10/2020    Varicella 09/30/1997, 06/20/2007       Patient Active Problem List   Diagnosis    On pre-exposure prophylaxis for HIV    Depression, recurrent (H24)    Anxiety disorder    Substance abuse (H)    ADHD (attention deficit hyperactivity disorder), inattentive type    Allergic rhinitis       Current Outpatient Medications   Medication Sig Dispense Refill    citalopram (CELEXA) 40 MG tablet Take 1 tablet (40 mg) by mouth daily 90 tablet 1    clonazePAM (KLONOPIN) 0.5 MG tablet Take 0.5 mg by mouth as needed for anxiety anxiety      etonogestrel (NEXPLANON) 68 MG IMPL Inject 1 each Subcutaneous once      valACYclovir (VALTREX) 1000 mg tablet  (Patient not taking: Reported on 10/10/2023)       No current facility-administered medications for this visit.       No Known Allergies         Physical Exam:   /79   Pulse 87   Temp 98.7  F (37.1  C) (Oral)   Ht 1.575 m (5' 2\")   Wt 47.8 kg (105 lb 6.4 oz)   SpO2 98%   BMI 19.28 kg/m    Wt Readings from Last 4 Encounters:   05/01/24 47.3 kg (104 lb 3.2 oz)   09/19/23 48.5 kg (107 lb)   08/16/23 50.3 kg (111 lb)   08/11/23 49.9 kg (110 lb)     Exam:   GENERAL: well-developed, well-nourished, alert, oriented, in no acute distress.  HEAD: Head is normocephalic, atraumatic   EYES: Eyes have anicteric sclerae.    ENT: Oropharynx is moist without exudates or ulcers.  NECK: Supple.  LUNGS: Normal WOB on Ra.   SKIN: No acute rashes.   NEUROLOGIC: Grossly nonfocal.         Laboratory " Data:     Metabolic Studies    Recent Labs   Lab Test 05/01/24  1711 08/16/23  1719     --    POTASSIUM 4.2  --    CHLORIDE 101  --    CO2 24  --    ANIONGAP 13  --    BUN 18.8  --    CR 0.77 0.80   GFRESTIMATED >90 >90   GLC 84  --    DIAN 10.0  --    MAG 2.2  --      Hepatic Studies    Recent Labs   Lab Test 05/01/24  1711   BILITOTAL 0.9   ALKPHOS 57   PROTTOTAL 7.7   ALBUMIN 4.9   AST 24   ALT 19     Hematology Studies     Recent Labs   Lab Test 05/01/24  1711   WBC 10.7   HGB 14.3   HCT 42.1        Imaging:   Results for orders placed or performed in visit on 05/07/24   XR Cervical Spine 2/3 Views    Narrative    Exam: XR CERVICAL SPINE 2/3 VIEWS, 5/7/2024 3:57 PM    Indication: Pins and needles sensation    Comparison: None    Findings:   AP, lateral and odontoid views cervical spine preserved alignment.  Draining of the normal cervical lordosis. Minimal anterior osteophytic  spurring at C5-6. Odontoid view is unremarkable. Disc heights  preserved. No compression deformity. No prevertebral soft tissue  thickening.  Visualized lung fields are clear.      Impression    Impression: Minimal degree of degeneration in the lower cervical  spine. Preserved alignment. No acute pathology.    I have personally reviewed the examination and initial interpretation  and I agree with the findings.    OTIS MCCOY MD         SYSTEM ID:  [id number]

## 2024-05-10 ENCOUNTER — OFFICE VISIT (OUTPATIENT)
Dept: INFECTIOUS DISEASES | Facility: CLINIC | Age: 29
End: 2024-05-10
Attending: INTERNAL MEDICINE
Payer: COMMERCIAL

## 2024-05-10 ENCOUNTER — LAB (OUTPATIENT)
Dept: LAB | Facility: CLINIC | Age: 29
End: 2024-05-10
Payer: COMMERCIAL

## 2024-05-10 VITALS
OXYGEN SATURATION: 98 % | DIASTOLIC BLOOD PRESSURE: 79 MMHG | WEIGHT: 105.4 LBS | HEART RATE: 87 BPM | BODY MASS INDEX: 19.4 KG/M2 | SYSTOLIC BLOOD PRESSURE: 119 MMHG | HEIGHT: 62 IN | TEMPERATURE: 98.7 F

## 2024-05-10 DIAGNOSIS — Z11.4 ENCOUNTER FOR HIV SCREENING AND DISCUSSION OF PRE-EXPOSURE PROPHYLAXIS FOR HIV: Primary | ICD-10-CM

## 2024-05-10 DIAGNOSIS — Z71.89 ENCOUNTER FOR HIV SCREENING AND DISCUSSION OF PRE-EXPOSURE PROPHYLAXIS FOR HIV: ICD-10-CM

## 2024-05-10 DIAGNOSIS — Z29.81 ENCOUNTER FOR HIV SCREENING AND DISCUSSION OF PRE-EXPOSURE PROPHYLAXIS FOR HIV: ICD-10-CM

## 2024-05-10 DIAGNOSIS — Z11.4 ENCOUNTER FOR HIV SCREENING AND DISCUSSION OF PRE-EXPOSURE PROPHYLAXIS FOR HIV: ICD-10-CM

## 2024-05-10 DIAGNOSIS — Z29.81 ENCOUNTER FOR HIV SCREENING AND DISCUSSION OF PRE-EXPOSURE PROPHYLAXIS FOR HIV: Primary | ICD-10-CM

## 2024-05-10 DIAGNOSIS — Z71.89 ENCOUNTER FOR HIV SCREENING AND DISCUSSION OF PRE-EXPOSURE PROPHYLAXIS FOR HIV: Primary | ICD-10-CM

## 2024-05-10 LAB
C TRACH DNA SPEC QL NAA+PROBE: NEGATIVE
N GONORRHOEA DNA SPEC QL NAA+PROBE: NEGATIVE
T PALLIDUM AB SER QL: NONREACTIVE

## 2024-05-10 PROCEDURE — 87491 CHLMYD TRACH DNA AMP PROBE: CPT | Performed by: INTERNAL MEDICINE

## 2024-05-10 PROCEDURE — 86780 TREPONEMA PALLIDUM: CPT | Performed by: INTERNAL MEDICINE

## 2024-05-10 PROCEDURE — 87536 HIV-1 QUANT&REVRSE TRNSCRPJ: CPT | Performed by: INTERNAL MEDICINE

## 2024-05-10 PROCEDURE — 99214 OFFICE O/P EST MOD 30 MIN: CPT | Performed by: INTERNAL MEDICINE

## 2024-05-10 PROCEDURE — 87591 N.GONORRHOEAE DNA AMP PROB: CPT | Performed by: INTERNAL MEDICINE

## 2024-05-10 PROCEDURE — 99213 OFFICE O/P EST LOW 20 MIN: CPT | Performed by: INTERNAL MEDICINE

## 2024-05-10 PROCEDURE — 99000 SPECIMEN HANDLING OFFICE-LAB: CPT | Performed by: PATHOLOGY

## 2024-05-10 PROCEDURE — 36415 COLL VENOUS BLD VENIPUNCTURE: CPT | Performed by: PATHOLOGY

## 2024-05-10 PROCEDURE — 87389 HIV-1 AG W/HIV-1&-2 AB AG IA: CPT | Performed by: INTERNAL MEDICINE

## 2024-05-10 ASSESSMENT — PAIN SCALES - GENERAL: PAINLEVEL: MODERATE PAIN (4)

## 2024-05-10 NOTE — LETTER
5/10/2024       RE: Kaya Lundberg  [address] [facility] [address]     Dear Colleague,    Thank you for referring your patient, Kaya Lundberg, to the [facility] INFECTIOUS DISEASE CLINIC [place] at [facility]. Please see a copy of my visit note below.    [facility]  General Infectious Disease Clinic Note: Follow Up     Patient:  Kaya Lundberg, Date of birth 1995   Medical record number [id number]  Date of Visit:  05/10/2024      Assessment       Serodiscordant partner  Encounter for PrEP counseling  Anxiety    Ms. Lundberg presents to ID clinic for follow up of PrEP, though she has not yet started it. Her serodiscordant partner currently has an undetectable viral load and is adherent to ART. As such, we discussed that her risk of acquiring HIV from him is very low (U=U), though not impossible. We discussed various ways viral loads can be high and the risk of transmission is possible. She understands this risk and would like to avoid taking PrEP. She notes she may consider using it in the future if she tries to conceive. We also discussed risk of HIV transmission from a mother to baby, and how it is very low if the mother has a suppressed viral load. She has not had an HIV test since September when her partner was diagnosed, and has had unprotected sex during that time. As such, I would like to obtain an HIV VL and Ag/Ab screen today. If negative, and her partner remains undetectable, her risk of HIV acquisition is very low. She doesn't need regular follow up with me if she is not taking PrEP, but I am very happy to see her in the future should she wish to start it.      Recommendations     HIV RNA and Ag/Ab screen today  Pt to contact us if/when she would like to start PrEP.     RTC: PRN    I spent a total of 30 minutes on the day of the visit.   Time spent by me doing chart review, history and exam, documentation and  "further activities per the note    Marjan Mg MD  Pager [phone]  Infectious Diseases     Interval Updates:   - Pt has not started PrEP. She remains sexually active with HIV+ partner. They occasionally use condoms, but not regularly.   - Denies flu-like illness, pharyngitis, cervical LAD, etc.   - Pt provides screen shots of partner's labs. VL is undetectable on dovato, CD4 is wnl. She thinks partner has had undetectable viral load for several months. He is regular with his follow up and takes his medication regularly.   - She states she is not currently interested in taking PrEP, given her partner's suppressed VL. She might consider PrEP in the future if she tries to conceive.      History of the Infectious Disease lllness:   Kaya Lundberg is a 27 year old female who is here to discuss PrEP. Her partner was recently diagnosed with HIV during a blood screening test.      Patient reports she has been with partner for about 5 months and have been sexually active without condom use. Partner tested positive for HIV last week when he went to donate plasma. Patient reports partner is \"shocked\" and denies all risk factors for acquiring HIV.      Patient's boyfriend is present today and both report no other partners outside of the relationship. I was able to conduct part of this interview with only the patient present. Both patient and partner report no IV drug use. Both had sexual partners prior to this relationship with no condom use.     Review of Systems: The remainder of a complete ROS was negative, except as noted in HPI.     No past medical history on file.    Past Surgical History:   Procedure Laterality Date    APPENDECTOMY      ENT SURGERY      tonsillectomy and adenoidectomy    WISDOM TOOTH EXTRACTION         Immunization History   Administered Date(s) Administered    COVID-19 MONOVALENT 12+ (Pfizer) 11/16/2021    COVID-19 Vaccine ([employer]) 04/10/2021    DTAP (<7y) 05/09/2002    DTP-Hib 1996, " "01/06/1997, 03/06/1997, 02/19/1998    HPV Quadrivalent 03/24/2008, 05/30/2008, 09/30/2008    Hepatitis B, Peds 1996, 01/06/1997, 03/06/1997    Influenza (IIV3) PF 11/06/2007, 10/16/2008, 09/22/2009    Influenza Intranasal Vaccine 11/08/2006    Influenza Vaccine >6 months,quad, PF 11/01/2017, 10/17/2018, 02/10/2020, 11/16/2021    Influenza, seasonal, injectable, PF 11/06/2007, 10/16/2008    MMR 02/19/1998, 06/20/2007    Meningococcal ACWY (Menactra ) 06/15/2009, 08/29/2014    Meningococcal ACWY (Menveo ) 08/29/2014    OPV, trivalent, live 1996, 01/06/1997, 02/19/1998    OPV, unspecified 1996, 01/06/1997, 02/19/1998    Poliovirus, inactivated (IPV) 05/09/2002    TDAP (Adacel,Boostrix) 06/15/2009    Td, Absorbed, Pf, Adult, Lf Unspecified 02/10/2020    Tdap (Adult) Unspecified Formulation 02/10/2020    Varicella 09/30/1997, 06/20/2007       Patient Active Problem List   Diagnosis    On pre-exposure prophylaxis for HIV    Depression, recurrent (H24)    Anxiety disorder    Substance abuse (H)    ADHD (attention deficit hyperactivity disorder), inattentive type    Allergic rhinitis       Current Outpatient Medications   Medication Sig Dispense Refill    citalopram (CELEXA) 40 MG tablet Take 1 tablet (40 mg) by mouth daily 90 tablet 1    clonazePAM (KLONOPIN) 0.5 MG tablet Take 0.5 mg by mouth as needed for anxiety anxiety      etonogestrel (NEXPLANON) 68 MG IMPL Inject 1 each Subcutaneous once      valACYclovir (VALTREX) 1000 mg tablet  (Patient not taking: Reported on 10/10/2023)       No current facility-administered medications for this visit.       No Known Allergies         Physical Exam:   /79   Pulse 87   Temp 98.7  F (37.1  C) (Oral)   Ht 1.575 m (5' 2\")   Wt 47.8 kg (105 lb 6.4 oz)   SpO2 98%   BMI 19.28 kg/m    Wt Readings from Last 4 Encounters:   05/01/24 47.3 kg (104 lb 3.2 oz)   09/19/23 48.5 kg (107 lb)   08/16/23 50.3 kg (111 lb)   08/11/23 49.9 kg (110 lb)     Exam: "   GENERAL: well-developed, well-nourished, alert, oriented, in no acute distress.  HEAD: Head is normocephalic, atraumatic   EYES: Eyes have anicteric sclerae.    ENT: Oropharynx is moist without exudates or ulcers.  NECK: Supple.  LUNGS: Normal WOB on Ra.   SKIN: No acute rashes.   NEUROLOGIC: Grossly nonfocal.         Laboratory Data:     Metabolic Studies    Recent Labs   Lab Test 05/01/24  1711 08/16/23  1719     --    POTASSIUM 4.2  --    CHLORIDE 101  --    CO2 24  --    ANIONGAP 13  --    BUN 18.8  --    CR 0.77 0.80   GFRESTIMATED >90 >90   GLC 84  --    DIAN 10.0  --    MAG 2.2  --      Hepatic Studies    Recent Labs   Lab Test 05/01/24  1711   BILITOTAL 0.9   ALKPHOS 57   PROTTOTAL 7.7   ALBUMIN 4.9   AST 24   ALT 19     Hematology Studies     Recent Labs   Lab Test 05/01/24  1711   WBC 10.7   HGB 14.3   HCT 42.1        Imaging:   Results for orders placed or performed in visit on 05/07/24   XR Cervical Spine 2/3 Views    Narrative    Exam: XR CERVICAL SPINE 2/3 VIEWS, 5/7/2024 3:57 PM    Indication: Pins and needles sensation    Comparison: None    Findings:   AP, lateral and odontoid views cervical spine preserved alignment.  Draining of the normal cervical lordosis. Minimal anterior osteophytic  spurring at C5-6. Odontoid view is unremarkable. Disc heights  preserved. No compression deformity. No prevertebral soft tissue  thickening.  Visualized lung fields are clear.      Impression    Impression: Minimal degree of degeneration in the lower cervical  spine. Preserved alignment. No acute pathology.    I have personally reviewed the examination and initial interpretation  and I agree with the findings.    OTIS MCCOY MD         SYSTEM ID:  [id number]

## 2024-05-10 NOTE — NURSING NOTE
"Chief Complaint   Patient presents with    Follow Up     /79   Pulse 87   Temp 98.7  F (37.1  C) (Oral)   Ht 1.575 m (5' 2\")   Wt 47.8 kg (105 lb 6.4 oz)   SpO2 98%   BMI 19.28 kg/m    Beth Alvarez MA on 5/10/2024 at 11:26 AM    "

## 2024-05-11 LAB
HIV 1+2 AB+HIV1 P24 AG SERPL QL IA: NONREACTIVE
HIV1 RNA # PLAS NAA DL=20: NOT DETECTED COPIES/ML

## 2024-11-25 ENCOUNTER — FCC EXTENDED DOCUMENTATION (OUTPATIENT)
Dept: PSYCHOLOGY | Facility: CLINIC | Age: 29
End: 2024-11-25
Payer: COMMERCIAL
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
## 2024-11-26 ENCOUNTER — FCC EXTENDED DOCUMENTATION (OUTPATIENT)
Dept: PSYCHOLOGY | Facility: CLINIC | Age: 29
End: 2024-11-26
Payer: COMMERCIAL

## 2024-11-26 NOTE — PROGRESS NOTES
Case Consultation Record       Client Name: Kaya Lundberg   Date:  11-26-24    Diagnosis: No diagnosis found.    Therapist: Desiree Balbuena MA, TAMARA      Team Members Present:  [facility]    Purpose:   Risk Management    Recommendations:  Continue to review safety plan  Get a release for [facility]   Continue to provide crisis resources- [place] warmline       TAMARA Maxwell  November 26, 2024

## 2025-01-27 ENCOUNTER — OFFICE VISIT (OUTPATIENT)
Dept: URGENT CARE | Facility: URGENT CARE | Age: 30
End: 2025-01-27
Payer: COMMERCIAL

## 2025-01-27 VITALS
TEMPERATURE: 100.9 F | SYSTOLIC BLOOD PRESSURE: 125 MMHG | WEIGHT: 101 LBS | HEART RATE: 117 BPM | DIASTOLIC BLOOD PRESSURE: 78 MMHG | RESPIRATION RATE: 16 BRPM | OXYGEN SATURATION: 99 % | BODY MASS INDEX: 18.47 KG/M2

## 2025-01-27 DIAGNOSIS — N10 ACUTE PYELONEPHRITIS: Primary | ICD-10-CM

## 2025-01-27 DIAGNOSIS — R10.9 RIGHT FLANK PAIN: ICD-10-CM

## 2025-01-27 LAB
ALBUMIN UR-MCNC: 100 MG/DL
APPEARANCE UR: ABNORMAL
BACTERIA #/AREA URNS HPF: ABNORMAL /HPF
BILIRUB UR QL STRIP: NEGATIVE
COLOR UR AUTO: YELLOW
GLUCOSE UR STRIP-MCNC: NEGATIVE MG/DL
HGB UR QL STRIP: ABNORMAL
KETONES UR STRIP-MCNC: 15 MG/DL
LEUKOCYTE ESTERASE UR QL STRIP: ABNORMAL
NITRATE UR QL: POSITIVE
PH UR STRIP: 6.5 [PH] (ref 5–7)
RBC #/AREA URNS AUTO: ABNORMAL /HPF
SP GR UR STRIP: 1.02 (ref 1–1.03)
SQUAMOUS #/AREA URNS AUTO: ABNORMAL /LPF
UROBILINOGEN UR STRIP-ACNC: 0.2 E.U./DL
WBC #/AREA URNS AUTO: >100 /HPF
WBC CLUMPS #/AREA URNS HPF: PRESENT /HPF

## 2025-01-27 PROCEDURE — 81001 URINALYSIS AUTO W/SCOPE: CPT

## 2025-01-27 PROCEDURE — 99203 OFFICE O/P NEW LOW 30 MIN: CPT

## 2025-01-27 PROCEDURE — 87086 URINE CULTURE/COLONY COUNT: CPT

## 2025-01-27 PROCEDURE — 87186 SC STD MICRODIL/AGAR DIL: CPT

## 2025-01-27 RX ORDER — ACETAMINOPHEN 500 MG
1000 TABLET ORAL EVERY 6 HOURS PRN
Qty: 60 TABLET | Refills: 0 | Status: SHIPPED | OUTPATIENT
Start: 2025-01-27

## 2025-01-27 RX ORDER — CEPHALEXIN 500 MG/1
500 CAPSULE ORAL 3 TIMES DAILY
Qty: 21 CAPSULE | Refills: 0 | Status: SHIPPED | OUTPATIENT
Start: 2025-01-27 | End: 2025-02-03

## 2025-01-27 NOTE — LETTER
January 27, 2025      Kaya Lundberg  [address] [place] [id number]        To Whom It May Concern:    Kaya Lundberg  was seen on January 27, 2025.  Please excuse her from work until January 31st due to illness.        Sincerely,        BO Carvalho CNP    Electronically signed

## 2025-01-28 NOTE — PROGRESS NOTES
ASSESSMENT:  (N10) Acute pyelonephritis  (primary encounter diagnosis)  Plan: cephALEXin (KEFLEX) 500 MG capsule,         acetaminophen (TYLENOL) 500 MG tablet    (R10.9) Right flank pain  Plan: UA Macroscopic with reflex to Microscopic and         Culture - Lab Collect, Urine Microscopic Exam,         Urine Culture, acetaminophen (TYLENOL) 500 MG         tablet    PLAN:  Discussed with the patient that the urine test shows a kidney infection.  Pyelonephritis patient instructions discussed and provided.  Informed the patient to take the antibiotic as prescribed and finish the full course even if symptoms improve.  We discussed trying yogurt with active cultures or probiotic such as Culturelle daily to help event diarrhea while taking the antibiotic.  We also discussed going to the emergency department with any new or worsening symptoms.  Work note provided.  Patient acknowledged her understanding of the above plan.    The use of Dragon/Barkibuation services may have been used to construct the content in this note; any grammatical or spelling errors are non-intentional. Please contact the author of this note directly if you are in need of any clarification.      BO Carvalho [facility]    SUBJECTIVE:   Kaya Lundberg is a 29 year old female with symptoms of RUQ abdominal pain with radiation into the right flank, nausea, fever, chills and urgency with urination for three days.  Treatment Tums.    ROS:  Negative except noted above.     OBJECTIVE:   /78 (BP Location: Left arm, Patient Position: Sitting, Cuff Size: Adult Regular)   Pulse (!) 117   Temp 100.9  F (38.3  C) (Oral)   Resp 16   Wt 45.8 kg (101 lb)   SpO2 99%   BMI 18.47 kg/m    GENERAL APPEARANCE: healthy, alert and no distress  EYES: EOMI,  PERRL, conjunctiva clear  HENT: nose and mouth without erythema, ulcers or lesions and oral mucous membranes moist, no erythema noted  NECK: supple,  nontender, no lymphadenopathy  RESP: lungs clear to auscultation - no rales, rhonchi or wheezes  CV: regular rates and rhythm, normal S1 S2, no murmur noted  BACK: Right sided CVA tenderness  ABDOMEN: soft, normal bowel sounds, tenderness mild RUQ  SKIN: no suspicious lesions or rashes

## 2025-01-28 NOTE — PATIENT INSTRUCTIONS
Urine test shows a kidney infection.  Take the antibiotic as prescribed and finish the full course even if symptoms improve.  Try yogurt with active cultures or probiotics such as Culturelle daily to help prevent diarrhea while using antibiotics.  Go to the emergency department with any new or worsening symptoms.

## 2025-01-29 LAB — BACTERIA UR CULT: ABNORMAL

## 2025-02-16 ENCOUNTER — HEALTH MAINTENANCE LETTER (OUTPATIENT)
Age: 30
End: 2025-02-16

## 2025-02-26 ENCOUNTER — FCC EXTENDED DOCUMENTATION (OUTPATIENT)
Dept: PSYCHOLOGY | Facility: CLINIC | Age: 30
End: 2025-02-26
Payer: COMMERCIAL

## 2025-02-26 NOTE — PROGRESS NOTES
Discharge Summary  Single Session    Client Name: Kaya Lundberg MRN#: [id number] YOB: 1995      Intake / Discharge Date: 11-22-24      DSM5 Diagnoses: (Sustained by DSM5 Criteria Listed Above)  PTSD  General Anxiety Disorder  Major Depression, Recurrent, Moderate  Borderline Personality Disorder  Alcohol Use Disorder, Moderate, In Early Remission         Presenting Concern:  History of mental health, relational issues, chemical health issues and trauma.       Reason for Discharge:  Multiple no shows with no return call       Disposition at Time of Last Encounter:   Comments:   Completed diagnostic assessment and safety plan      Risk Management:       Avinash Safety Plan       Creation Date: 11/22/24 Last Update Date: 11/22/24      Step 1: Warning signs:     Warning Signs     Hoplessness     Worries about current issues     More PTSD triggers     Panic     Frustration      Step 2: Internal coping strategies - Things I can do to take my mind off my problems without contacting another person:     Strategies     Reaching out to people I know are going to talk to me     Get on phone and play a game      Step 3: People and social settings that provide distraction:     Name Contact Information     Co workers in the work setting       Grandpa- Enjoy talking with       Mother          Places     Work      Step 4: People whom I can ask for help during a crisis:     Name Contact Information     Boyfriend (Arnulfo) [phone]     Mother [phone]      Step 5: Professionals or agencies I can contact during a crisis:     Clinician/Agency Name Phone Emergency Contact     [facility] [phone]          [facility] Emergency Department Emergency Department Address Emergency Department Phone     [place] Warmline [phone]          Suicide Prevention Lifeline Phone: Call or Text 803  Crisis Text Line: Text HOME to 757468     Step 6: Making the environment safer (plan for  lethal means safety):   Declined to answer     Optional: What is most important to me and worth living for?:   Have my own place  Have a healthy life  Have own Family  Graduate college  Have a healthy relationship      Avinash Safety Plan. Kellee Sanchez and Bruce Freitas. Used with permission of the authors.               Provider Name/ Credentials:  Desiree Balbuena MA, LMFT                    November 25, 2024        Referred To:  Patient may return but would need to be more willing to comply with the attendance agreement. Her phone was not accepting voice mails the last attempt provider made at contact.          Desiree Balbuena, TAMARA   2/26/2025